# Patient Record
Sex: MALE | Race: WHITE | NOT HISPANIC OR LATINO | Employment: FULL TIME
[De-identification: names, ages, dates, MRNs, and addresses within clinical notes are randomized per-mention and may not be internally consistent; named-entity substitution may affect disease eponyms.]

---

## 2017-01-06 ENCOUNTER — RECORDS - HEALTHEAST (OUTPATIENT)
Dept: ADMINISTRATIVE | Facility: OTHER | Age: 57
End: 2017-01-06

## 2017-02-08 ENCOUNTER — COMMUNICATION - HEALTHEAST (OUTPATIENT)
Dept: FAMILY MEDICINE | Facility: CLINIC | Age: 57
End: 2017-02-08

## 2017-02-08 DIAGNOSIS — I10 ESSENTIAL HYPERTENSION: ICD-10-CM

## 2017-10-22 ENCOUNTER — COMMUNICATION - HEALTHEAST (OUTPATIENT)
Dept: FAMILY MEDICINE | Facility: CLINIC | Age: 57
End: 2017-10-22

## 2017-10-22 DIAGNOSIS — I10 ESSENTIAL HYPERTENSION: ICD-10-CM

## 2017-12-13 ENCOUNTER — COMMUNICATION - HEALTHEAST (OUTPATIENT)
Dept: SCHEDULING | Facility: CLINIC | Age: 57
End: 2017-12-13

## 2017-12-13 DIAGNOSIS — I10 ESSENTIAL HYPERTENSION: ICD-10-CM

## 2018-01-02 ENCOUNTER — OFFICE VISIT - HEALTHEAST (OUTPATIENT)
Dept: FAMILY MEDICINE | Facility: CLINIC | Age: 58
End: 2018-01-02

## 2018-01-02 DIAGNOSIS — E78.00 PURE HYPERCHOLESTEROLEMIA: ICD-10-CM

## 2018-01-02 DIAGNOSIS — N18.30 CHRONIC KIDNEY DISEASE, STAGE III (MODERATE) (H): ICD-10-CM

## 2018-01-02 DIAGNOSIS — K40.90 LEFT INGUINAL HERNIA: ICD-10-CM

## 2018-01-02 DIAGNOSIS — I12.9 NEPHROSCLEROSIS: ICD-10-CM

## 2018-01-02 DIAGNOSIS — Z12.11 SCREEN FOR COLON CANCER: ICD-10-CM

## 2018-01-02 DIAGNOSIS — Z23 FLU VACCINE NEED: ICD-10-CM

## 2018-01-02 DIAGNOSIS — I10 ESSENTIAL HYPERTENSION: ICD-10-CM

## 2018-01-02 DIAGNOSIS — K21.9 ESOPHAGEAL REFLUX: ICD-10-CM

## 2018-01-02 ASSESSMENT — MIFFLIN-ST. JEOR: SCORE: 1802.82

## 2018-01-15 ENCOUNTER — COMMUNICATION - HEALTHEAST (OUTPATIENT)
Dept: SURGERY | Facility: CLINIC | Age: 58
End: 2018-01-15

## 2018-01-18 ENCOUNTER — OFFICE VISIT - HEALTHEAST (OUTPATIENT)
Dept: SURGERY | Facility: CLINIC | Age: 58
End: 2018-01-18

## 2018-01-18 DIAGNOSIS — K40.90 LEFT INGUINAL HERNIA: ICD-10-CM

## 2018-01-18 ASSESSMENT — MIFFLIN-ST. JEOR: SCORE: 1782.41

## 2018-02-07 LAB
ALBUMIN SERPL-MCNC: 3.9 G/DL (ref 3.5–5)
ALBUMIN SERPL-MCNC: 3.9 G/DL (ref 3.5–5)
ALP SERPL-CCNC: 73 U/L (ref 45–120)
ALT SERPL W P-5'-P-CCNC: 65 U/L (ref 0–45)
ANION GAP SERPL CALCULATED.3IONS-SCNC: 11 MMOL/L (ref 5–18)
AST SERPL W P-5'-P-CCNC: 43 U/L (ref 0–40)
BILIRUB DIRECT SERPL-MCNC: 0.2 MG/DL
BILIRUB SERPL-MCNC: 0.9 MG/DL (ref 0–1)
BUN SERPL-MCNC: 26 MG/DL (ref 8–22)
CALCIUM SERPL-MCNC: 9.6 MG/DL (ref 8.5–10.5)
CHLORIDE BLD-SCNC: 104 MMOL/L (ref 98–107)
CHOLEST SERPL-MCNC: 214 MG/DL
CO2 SERPL-SCNC: 24 MMOL/L (ref 22–31)
CREAT SERPL-MCNC: 1.91 MG/DL (ref 0.7–1.3)
ERYTHROCYTE [DISTWIDTH] IN BLOOD BY AUTOMATED COUNT: 13.5 % (ref 11–14.5)
FASTING STATUS PATIENT QL REPORTED: YES
GFR SERPL CREATININE-BSD FRML MDRD: 37 ML/MIN/1.73M2
GLUCOSE BLD-MCNC: 78 MG/DL (ref 70–125)
HCT VFR BLD AUTO: 43.4 % (ref 40–54)
HDLC SERPL-MCNC: 38 MG/DL
HGB BLD-MCNC: 14.6 G/DL (ref 14–18)
LDLC SERPL CALC-MCNC: 149 MG/DL
MCH RBC QN AUTO: 30.3 PG (ref 27–34)
MCHC RBC AUTO-ENTMCNC: 33.6 G/DL (ref 32–36)
MCV RBC AUTO: 90 FL (ref 80–100)
PHOSPHATE SERPL-MCNC: 2.8 MG/DL (ref 2.5–4.5)
PLATELET # BLD AUTO: 172 THOU/UL (ref 140–440)
PMV BLD AUTO: 7.8 FL (ref 7–10)
POTASSIUM BLD-SCNC: 4.3 MMOL/L (ref 3.5–5)
PROT SERPL-MCNC: 7.2 G/DL (ref 6–8)
RBC # BLD AUTO: 4.81 MILL/UL (ref 4.4–6.2)
SODIUM SERPL-SCNC: 139 MMOL/L (ref 136–145)
TRIGL SERPL-MCNC: 135 MG/DL
WBC: 5.5 THOU/UL (ref 4–11)

## 2018-02-08 LAB — 25(OH)D3 SERPL-MCNC: 31.5 NG/ML (ref 30–80)

## 2018-02-12 ENCOUNTER — COMMUNICATION - HEALTHEAST (OUTPATIENT)
Dept: SCHEDULING | Facility: CLINIC | Age: 58
End: 2018-02-12

## 2018-02-12 DIAGNOSIS — I10 ESSENTIAL HYPERTENSION: ICD-10-CM

## 2018-02-21 ENCOUNTER — COMMUNICATION - HEALTHEAST (OUTPATIENT)
Dept: FAMILY MEDICINE | Facility: CLINIC | Age: 58
End: 2018-02-21

## 2018-02-26 ENCOUNTER — OFFICE VISIT - HEALTHEAST (OUTPATIENT)
Dept: FAMILY MEDICINE | Facility: CLINIC | Age: 58
End: 2018-02-26

## 2018-02-26 DIAGNOSIS — E78.00 PURE HYPERCHOLESTEROLEMIA: ICD-10-CM

## 2018-02-26 DIAGNOSIS — N18.30 CHRONIC KIDNEY DISEASE, STAGE III (MODERATE) (H): ICD-10-CM

## 2018-02-26 DIAGNOSIS — K40.90 LEFT INGUINAL HERNIA: ICD-10-CM

## 2018-02-26 DIAGNOSIS — I10 ESSENTIAL HYPERTENSION: ICD-10-CM

## 2018-02-26 DIAGNOSIS — Z01.818 PREOPERATIVE EXAMINATION: ICD-10-CM

## 2018-02-26 LAB
ATRIAL RATE - MUSE: 57 BPM
DIASTOLIC BLOOD PRESSURE - MUSE: NORMAL MMHG
INTERPRETATION ECG - MUSE: NORMAL
P AXIS - MUSE: 57 DEGREES
PR INTERVAL - MUSE: 140 MS
QRS DURATION - MUSE: 92 MS
QT - MUSE: 422 MS
QTC - MUSE: 410 MS
R AXIS - MUSE: -4 DEGREES
SYSTOLIC BLOOD PRESSURE - MUSE: NORMAL MMHG
T AXIS - MUSE: 3 DEGREES
VENTRICULAR RATE- MUSE: 57 BPM

## 2018-02-26 ASSESSMENT — MIFFLIN-ST. JEOR: SCORE: 1781.16

## 2018-02-27 ENCOUNTER — ANESTHESIA - HEALTHEAST (OUTPATIENT)
Dept: SURGERY | Facility: AMBULATORY SURGERY CENTER | Age: 58
End: 2018-02-27

## 2018-02-28 ENCOUNTER — SURGERY - HEALTHEAST (OUTPATIENT)
Dept: SURGERY | Facility: AMBULATORY SURGERY CENTER | Age: 58
End: 2018-02-28

## 2018-02-28 ASSESSMENT — MIFFLIN-ST. JEOR: SCORE: 1745.33

## 2019-02-19 ENCOUNTER — COMMUNICATION - HEALTHEAST (OUTPATIENT)
Dept: SCHEDULING | Facility: CLINIC | Age: 59
End: 2019-02-19

## 2019-02-19 DIAGNOSIS — I10 ESSENTIAL HYPERTENSION: ICD-10-CM

## 2019-03-11 ENCOUNTER — COMMUNICATION - HEALTHEAST (OUTPATIENT)
Dept: FAMILY MEDICINE | Facility: CLINIC | Age: 59
End: 2019-03-11

## 2019-04-12 ENCOUNTER — COMMUNICATION - HEALTHEAST (OUTPATIENT)
Dept: SCHEDULING | Facility: CLINIC | Age: 59
End: 2019-04-12

## 2019-04-12 DIAGNOSIS — I10 ESSENTIAL HYPERTENSION: ICD-10-CM

## 2019-05-21 ENCOUNTER — COMMUNICATION - HEALTHEAST (OUTPATIENT)
Dept: FAMILY MEDICINE | Facility: CLINIC | Age: 59
End: 2019-05-21

## 2019-05-21 DIAGNOSIS — I10 ESSENTIAL HYPERTENSION: ICD-10-CM

## 2019-06-03 ENCOUNTER — OFFICE VISIT - HEALTHEAST (OUTPATIENT)
Dept: FAMILY MEDICINE | Facility: CLINIC | Age: 59
End: 2019-06-03

## 2019-06-03 DIAGNOSIS — E78.00 PURE HYPERCHOLESTEROLEMIA: ICD-10-CM

## 2019-06-03 DIAGNOSIS — N18.30 CHRONIC KIDNEY DISEASE, STAGE III (MODERATE) (H): ICD-10-CM

## 2019-06-03 DIAGNOSIS — Z12.11 SCREEN FOR COLON CANCER: ICD-10-CM

## 2019-06-03 DIAGNOSIS — I10 ESSENTIAL HYPERTENSION: ICD-10-CM

## 2019-06-03 DIAGNOSIS — I12.9 NEPHROSCLEROSIS, STAGE 1 THROUGH STAGE 4 OR UNSPECIFIED CHRONIC KIDNEY DISEASE: ICD-10-CM

## 2019-06-03 LAB
25(OH)D3 SERPL-MCNC: 42.2 NG/ML (ref 30–80)
25(OH)D3 SERPL-MCNC: 42.2 NG/ML (ref 30–80)
ALBUMIN SERPL-MCNC: 3.9 G/DL (ref 3.5–5)
ALBUMIN SERPL-MCNC: 3.9 G/DL (ref 3.5–5)
ALP SERPL-CCNC: 74 U/L (ref 45–120)
ALT SERPL W P-5'-P-CCNC: 40 U/L (ref 0–45)
ANION GAP SERPL CALCULATED.3IONS-SCNC: 10 MMOL/L (ref 5–18)
AST SERPL W P-5'-P-CCNC: 35 U/L (ref 0–40)
BILIRUB DIRECT SERPL-MCNC: 0.3 MG/DL
BILIRUB SERPL-MCNC: 0.7 MG/DL (ref 0–1)
BUN SERPL-MCNC: 29 MG/DL (ref 8–22)
CALCIUM SERPL-MCNC: 9.7 MG/DL (ref 8.5–10.5)
CHLORIDE BLD-SCNC: 107 MMOL/L (ref 98–107)
CHOLEST SERPL-MCNC: 188 MG/DL
CO2 SERPL-SCNC: 22 MMOL/L (ref 22–31)
CREAT SERPL-MCNC: 2.05 MG/DL (ref 0.7–1.3)
ERYTHROCYTE [DISTWIDTH] IN BLOOD BY AUTOMATED COUNT: 13.3 % (ref 11–14.5)
FASTING STATUS PATIENT QL REPORTED: YES
GFR SERPL CREATININE-BSD FRML MDRD: 34 ML/MIN/1.73M2
GLUCOSE BLD-MCNC: 90 MG/DL (ref 70–125)
HBA1C MFR BLD: 5.1 % (ref 3.5–6)
HCT VFR BLD AUTO: 42.3 % (ref 40–54)
HDLC SERPL-MCNC: 38 MG/DL
HGB BLD-MCNC: 14.2 G/DL (ref 14–18)
LDLC SERPL CALC-MCNC: 131 MG/DL
MCH RBC QN AUTO: 30.5 PG (ref 27–34)
MCHC RBC AUTO-ENTMCNC: 33.6 G/DL (ref 32–36)
MCV RBC AUTO: 91 FL (ref 80–100)
PHOSPHATE SERPL-MCNC: 2.6 MG/DL (ref 2.5–4.5)
PLATELET # BLD AUTO: 130 THOU/UL (ref 140–440)
PMV BLD AUTO: 7.9 FL (ref 7–10)
POTASSIUM BLD-SCNC: 4.6 MMOL/L (ref 3.5–5)
PROT SERPL-MCNC: 6.8 G/DL (ref 6–8)
RBC # BLD AUTO: 4.66 MILL/UL (ref 4.4–6.2)
SODIUM SERPL-SCNC: 139 MMOL/L (ref 136–145)
TRIGL SERPL-MCNC: 97 MG/DL
WBC: 5.6 THOU/UL (ref 4–11)

## 2019-06-03 RX ORDER — SIMVASTATIN 10 MG
10 TABLET ORAL AT BEDTIME
Qty: 90 TABLET | Refills: 3 | Status: SHIPPED | OUTPATIENT
Start: 2019-06-03 | End: 2022-11-02

## 2019-06-05 ENCOUNTER — COMMUNICATION - HEALTHEAST (OUTPATIENT)
Dept: FAMILY MEDICINE | Facility: CLINIC | Age: 59
End: 2019-06-05

## 2019-06-21 ENCOUNTER — COMMUNICATION - HEALTHEAST (OUTPATIENT)
Dept: FAMILY MEDICINE | Facility: CLINIC | Age: 59
End: 2019-06-21

## 2019-06-21 DIAGNOSIS — I10 ESSENTIAL HYPERTENSION: ICD-10-CM

## 2019-10-19 ENCOUNTER — RECORDS - HEALTHEAST (OUTPATIENT)
Dept: ADMINISTRATIVE | Facility: OTHER | Age: 59
End: 2019-10-19

## 2019-10-21 ENCOUNTER — COMMUNICATION - HEALTHEAST (OUTPATIENT)
Dept: FAMILY MEDICINE | Facility: CLINIC | Age: 59
End: 2019-10-21

## 2019-10-23 ENCOUNTER — OFFICE VISIT - HEALTHEAST (OUTPATIENT)
Dept: FAMILY MEDICINE | Facility: CLINIC | Age: 59
End: 2019-10-23

## 2019-10-23 DIAGNOSIS — Z23 IMMUNIZATION DUE: ICD-10-CM

## 2019-10-23 DIAGNOSIS — R16.1 SPLENIC MASS: ICD-10-CM

## 2019-10-23 DIAGNOSIS — N28.9 LESION OF BOTH NATIVE KIDNEYS: ICD-10-CM
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
## 2019-10-23 ASSESSMENT — MIFFLIN-ST. JEOR: SCORE: 1820.97

## 2019-10-24 ENCOUNTER — COMMUNICATION - HEALTHEAST (OUTPATIENT)
Dept: FAMILY MEDICINE | Facility: CLINIC | Age: 59
End: 2019-10-24

## 2019-11-05 ENCOUNTER — HOSPITAL ENCOUNTER (OUTPATIENT)
Dept: MRI IMAGING | Facility: HOSPITAL | Age: 59
Discharge: HOME | End: 2019-11-05
Attending: FAMILY MEDICINE

## 2019-11-05 DIAGNOSIS — R16.1 SPLENIC MASS: ICD-10-CM

## 2019-11-05 DIAGNOSIS — N28.9 LESION OF BOTH NATIVE KIDNEYS: ICD-10-CM

## 2019-11-05 LAB
CREAT BLD-MCNC: 2.1 MG/DL (ref 0.7–1.3)
GFR SERPL CREATININE-BSD FRML MDRD: 33 ML/MIN/1.73M2

## 2020-07-08 ENCOUNTER — COMMUNICATION - HEALTHEAST (OUTPATIENT)
Dept: FAMILY MEDICINE | Facility: CLINIC | Age: 60
End: 2020-07-08

## 2020-07-08 DIAGNOSIS — I10 ESSENTIAL HYPERTENSION: ICD-10-CM

## 2020-08-09 ENCOUNTER — COMMUNICATION - HEALTHEAST (OUTPATIENT)
Dept: FAMILY MEDICINE | Facility: CLINIC | Age: 60
End: 2020-08-09

## 2020-08-09 DIAGNOSIS — I10 ESSENTIAL HYPERTENSION: ICD-10-CM

## 2020-08-27 ENCOUNTER — COMMUNICATION - HEALTHEAST (OUTPATIENT)
Dept: FAMILY MEDICINE | Facility: CLINIC | Age: 60
End: 2020-08-27

## 2020-08-27 DIAGNOSIS — I10 ESSENTIAL HYPERTENSION: ICD-10-CM

## 2020-10-20 ENCOUNTER — RECORDS - HEALTHEAST (OUTPATIENT)
Dept: ADMINISTRATIVE | Facility: OTHER | Age: 60
End: 2020-10-20

## 2020-11-24 ENCOUNTER — COMMUNICATION - HEALTHEAST (OUTPATIENT)
Dept: FAMILY MEDICINE | Facility: CLINIC | Age: 60
End: 2020-11-24

## 2020-11-24 DIAGNOSIS — I10 ESSENTIAL HYPERTENSION: ICD-10-CM

## 2021-03-25 ENCOUNTER — COMMUNICATION - HEALTHEAST (OUTPATIENT)
Dept: FAMILY MEDICINE | Facility: CLINIC | Age: 61
End: 2021-03-25

## 2021-03-25 DIAGNOSIS — I10 ESSENTIAL HYPERTENSION: ICD-10-CM

## 2021-03-31 ENCOUNTER — COMMUNICATION - HEALTHEAST (OUTPATIENT)
Dept: FAMILY MEDICINE | Facility: CLINIC | Age: 61
End: 2021-03-31

## 2021-03-31 DIAGNOSIS — I10 ESSENTIAL HYPERTENSION: ICD-10-CM

## 2021-04-01 ENCOUNTER — AMBULATORY - HEALTHEAST (OUTPATIENT)
Dept: NURSING | Facility: CLINIC | Age: 61
End: 2021-04-01

## 2021-04-13 ENCOUNTER — OFFICE VISIT - HEALTHEAST (OUTPATIENT)
Dept: FAMILY MEDICINE | Facility: CLINIC | Age: 61
End: 2021-04-13

## 2021-04-13 DIAGNOSIS — Z12.11 COLON CANCER SCREENING: ICD-10-CM

## 2021-04-13 DIAGNOSIS — E78.00 PURE HYPERCHOLESTEROLEMIA: ICD-10-CM

## 2021-04-13 DIAGNOSIS — N18.32 STAGE 3B CHRONIC KIDNEY DISEASE (H): ICD-10-CM

## 2021-04-13 DIAGNOSIS — I10 ESSENTIAL HYPERTENSION: ICD-10-CM

## 2021-04-13 RX ORDER — LOSARTAN POTASSIUM 100 MG/1
100 TABLET ORAL DAILY
Qty: 90 TABLET | Refills: 3 | Status: SHIPPED | OUTPATIENT
Start: 2021-04-13 | End: 2022-04-21

## 2021-04-22 ENCOUNTER — AMBULATORY - HEALTHEAST (OUTPATIENT)
Dept: NURSING | Facility: CLINIC | Age: 61
End: 2021-04-22

## 2021-05-25 ENCOUNTER — RECORDS - HEALTHEAST (OUTPATIENT)
Dept: ADMINISTRATIVE | Facility: CLINIC | Age: 61
End: 2021-05-25

## 2021-05-27 NOTE — TELEPHONE ENCOUNTER
Please call.  I refilled losartan but he is due for an office visit.  Please have him make an appointment with me when he is able.

## 2021-05-27 NOTE — TELEPHONE ENCOUNTER
Left message for pt that he is due for an OV with Dr. Souza.  Please assist with scheduling upon return call.

## 2021-05-27 NOTE — TELEPHONE ENCOUNTER
RN cannot approve Refill Request    RN can NOT refill this medication PCP messaged that patient is overdue for Office Visit.        Bridgett Sandoval, [facility] Triage/Med Refill 4/15/2019    Requested Prescriptions   Pending Prescriptions Disp Refills     losartan (COZAAR) 100 MG tablet [Pharmacy Med Name: Losartan Potassium Oral Tablet 100 MG] 30 tablet 0     Sig: TAKE 1 TABLET (100 MG) BY MOUTH ONCE DAILY       Angiotensin Receptor Blocker Protocol Failed - 4/12/2019  2:54 PM        Failed - PCP or prescribing provider visit in past 12 months       Last office visit with prescriber/PCP: 1/2/2018 Derrell Souza MD OR same dept: Visit date not found OR same specialty: Visit date not found  Last physical: 2/26/2018 Last MTM visit: Visit date not found   Next visit within 3 mo: Visit date not found  Next physical within 3 mo: Visit date not found  Prescriber OR PCP: Derrell Souza MD  Last diagnosis associated with med order: 1. Essential hypertension  - losartan (COZAAR) 100 MG tablet [Pharmacy Med Name: Losartan Potassium Oral Tablet 100 MG]; TAKE 1 TABLET (100 MG) BY MOUTH ONCE DAILY   Dispense: 30 tablet; Refill: 0    If protocol passes may refill for 12 months if within 3 months of last provider visit (or a total of 15 months).             Failed - Serum potassium within the past 12 months     No results found for: LN-POTASSIUM          Failed - Blood pressure filed in past 12 months     BP Readings from Last 1 Encounters:   02/28/18 138/66             Failed - Serum creatinine within the past 12 months     Creatinine   Date Value Ref Range Status   02/07/2018 1.91 (H) 0.70 - 1.30 mg/dL Final

## 2021-05-29 NOTE — PROGRESS NOTES
Assessment/ Plan     1. Essential hypertension, currently stable    Continue losartan   Remain physically active  Continue to monitor blood pressure    2. Screen for colon cancer    Refer for a colonoscopy  - Ambulatory referral for Colonoscopy    3. Chronic kidney disease, stage III (moderate) (H)  4. Nephrosclerosis, stage 1 through stage 4 or unspecified chronic kidney disease    Check laboratory testing including a renal profile, vitamin D level, and hemogram of platelets  Continue losartan and simvastatin  Discussed the importance of adequate control of blood pressure as well as cholesterol  Recommend follow-up with neurology as he has not seen them for a number of years  Patient understands he will avoid NSAIDs    - Ambulatory referral to Nephrology    - HM2(CBC w/o Differential)  - Vitamin D, Total (25-Hydroxy)  - Renal Function Profile  - Ambulatory referral to Nephrology    5. Essential Hypercholesterolemia    Continue simvastatin  Check laboratory testing as noted  - Hepatic Profile  - Lipid Cascade  - Glycosylated Hemoglobin A1c  - simvastatin (ZOCOR) 10 MG tablet; Take 1 tablet (10 mg total) by mouth at bedtime.  Dispense: 90 tablet; Refill: 3      Subjective:       Daniel Le is a 59 y.o. male who presents for a medication check and follow-up for hypertension and chronic kidney disease.  He has a medical history of hypertension, hyperlipidemia, gastroesophageal reflux disease, and chronic kidney disease.  As noted, he has previously followed up with nephrology given an elevated creatinine and low GFR.  He was assessed to have arteriosclerosis due to hypertension, dyslipidemia, and aging.  He continues to take losartan and simvastatin has been compliant with his medications.  He has not seen nephrology for a number of years. His creatinine was 1.91 last year with a GFR of 37.    In February of 2018 he did have a left inguinal hernia repair.  He tolerated the surgery well.  His last total  cholesterol was 214 with an LDL of 149.    He reports he generally feels well.  Review of systems is negative for headache, dizziness, chest pain, palpitations, or bowel changes.  He is due for a colonoscopy.    The following portions of the patient's history were reviewed and updated as appropriate: allergies, current medications, past family history, past medical history, past social history, past surgical history and problem list. Medications have been reconciled.    Review of Systems   A 12 point comprehensive review of systems was negative except as noted.      Current Outpatient Medications   Medication Sig Dispense Refill     losartan (COZAAR) 100 MG tablet TAKE 1 TABLET (100 MG) BY MOUTH ONCE DAILY 30 tablet 0     simvastatin (ZOCOR) 10 MG tablet Take 1 tablet (10 mg total) by mouth at bedtime. 90 tablet 3     No current facility-administered medications for this visit.        Objective:      /74   Pulse 64   Wt 214 lb 14.4 oz (97.5 kg)   BMI 31.06 kg/m        General appearance: alert, appears stated age and cooperative  Head: Normocephalic, without obvious abnormality, atraumatic  Eyes: conjunctivae/corneas clear. PERRL, EOM's intact.   Nose: Nares normal. Septum midline. Mucosa normal  Throat: lips, mucosa, and tongue normal; teeth and gums normal  Neck: no adenopathy, supple, symmetrical, trachea midline   Back: symmetric, no curvature. ROM normal. No CVA tenderness.  Lungs: clear to auscultation bilaterally  Heart: regular rate and rhythm, S1, S2 normal, no murmur, click, rub or gallop  Extremities: extremities normal, atraumatic, no cyanosis or edema  Skin: Skin color, texture, turgor normal. No rashes or lesions  Lymph nodes: Cervical nodes normal.  Neurologic: Alert and oriented X 3         Recent Results (from the past 168 hour(s))   HM2(CBC w/o Differential)   Result Value Ref Range    WBC 5.6 4.0 - 11.0 thou/uL    RBC 4.66 4.40 - 6.20 mill/uL    Hemoglobin 14.2 14.0 - 18.0 g/dL     Hematocrit 42.3 40.0 - 54.0 %    MCV 91 80 - 100 fL    MCH 30.5 27.0 - 34.0 pg    MCHC 33.6 32.0 - 36.0 g/dL    RDW 13.3 11.0 - 14.5 %    Platelets 130 (L) 140 - 440 thou/uL    MPV 7.9 7.0 - 10.0 fL   Hepatic Profile   Result Value Ref Range    Bilirubin, Total 0.7 0.0 - 1.0 mg/dL    Bilirubin, Direct 0.3 <=0.5 mg/dL    Protein, Total 6.8 6.0 - 8.0 g/dL    Albumin 3.9 3.5 - 5.0 g/dL    Alkaline Phosphatase 74 45 - 120 U/L    AST 35 0 - 40 U/L    ALT 40 0 - 45 U/L   Lipid Cascade   Result Value Ref Range    Cholesterol 188 <=199 mg/dL    Triglycerides 97 <=149 mg/dL    HDL Cholesterol 38 (L) >=40 mg/dL    LDL Calculated 131 (H) <=129 mg/dL    Patient Fasting > 8hrs? Yes    Glycosylated Hemoglobin A1c   Result Value Ref Range    Hemoglobin A1c 5.1 3.5 - 6.0 %   Vitamin D, Total (25-Hydroxy)   Result Value Ref Range    Vitamin D, Total (25-Hydroxy) 42.2 30.0 - 80.0 ng/mL   Renal Function Profile   Result Value Ref Range    Albumin 3.9 3.5 - 5.0 g/dL    Calcium 9.7 8.5 - 10.5 mg/dL    Phosphorus 2.6 2.5 - 4.5 mg/dL    Glucose 90 70 - 125 mg/dL    BUN 29 (H) 8 - 22 mg/dL    Creatinine 2.05 (H) 0.70 - 1.30 mg/dL    Sodium 139 136 - 145 mmol/L    Potassium 4.6 3.5 - 5.0 mmol/L    Chloride 107 98 - 107 mmol/L    CO2 22 22 - 31 mmol/L    Anion Gap, Calculation 10 5 - 18 mmol/L    GFR MDRD Af Amer 41 (L) >60 mL/min/1.73m2    GFR MDRD Non Af Amer 34 (L) >60 mL/min/1.73m2          This note has been dictated using voice recognition software. Any grammatical or context distortions are unintentional and inherent to the software

## 2021-05-29 NOTE — TELEPHONE ENCOUNTER
RN cannot approve Refill Request    RN can NOT refill this medication PCP messaged that patient is overdue for Labs, Office Visit and blood pressure check. . Last office visit: 1/2/2018 Derrell Souza MD Last Physical: 2/26/2018 Last MTM visit: Visit date not found Last visit same specialty: 1/2/2018 Derrell Souza MD.  Next visit within 3 mo: Visit date not found  Next physical within 3 mo: Visit date not found      Patricia Hinojosa, [facility] Triage/Med Refill 5/21/2019    Requested Prescriptions   Pending Prescriptions Disp Refills     losartan (COZAAR) 100 MG tablet 30 tablet 0     Sig: TAKE 1 TABLET (100 MG) BY MOUTH ONCE DAILY       Angiotensin Receptor Blocker Protocol Failed - 5/21/2019  3:05 PM        Failed - PCP or prescribing provider visit in past 12 months       Last office visit with prescriber/PCP: 1/2/2018 Derrell Souza MD OR same dept: Visit date not found OR same specialty: 1/2/2018 Derrell Souza MD  Last physical: 2/26/2018 Last MTM visit: Visit date not found   Next visit within 3 mo: Visit date not found  Next physical within 3 mo: Visit date not found  Prescriber OR PCP: Derrell Souza MD  Last diagnosis associated with med order: 1. Essential hypertension  - losartan (COZAAR) 100 MG tablet; TAKE 1 TABLET (100 MG) BY MOUTH ONCE DAILY  Dispense: 30 tablet; Refill: 0    If protocol passes may refill for 12 months if within 3 months of last provider visit (or a total of 15 months).             Failed - Serum potassium within the past 12 months     No results found for: LN-POTASSIUM          Failed - Blood pressure filed in past 12 months     BP Readings from Last 1 Encounters:   02/28/18 138/66             Failed - Serum creatinine within the past 12 months     Creatinine   Date Value Ref Range Status   02/07/2018 1.91 (H) 0.70 - 1.30 mg/dL Final

## 2021-05-29 NOTE — PATIENT INSTRUCTIONS - HE
Continue losartan for blood pressure  We will check your laboratory testing as discussed  I do recommend follow-up with the kidney specialist  Set up your colonosocpy

## 2021-05-29 NOTE — TELEPHONE ENCOUNTER
Refill Request  Did you contact pharmacy: No.  Patient was informed to call the pharmacy.  Medication name:   Requested Prescriptions     Pending Prescriptions Disp Refills     losartan (COZAAR) 100 MG tablet 30 tablet 0     Sig: TAKE 1 TABLET (100 MG) BY MOUTH ONCE DAILY     Who prescribed the medication: Derrell Souza MD   [facility]  Is patient out of medication: Yes  Patient notified refills processed in 72 hours:  yes  Okay to leave a detailed message: no    Patient is scheduled to see Derrell Souza MD on 6/3/19.

## 2021-05-29 NOTE — TELEPHONE ENCOUNTER
Please call.  I will refill losartan but would like him to make an appointment with me.  We will need to do laboratory testing then.  Thank you

## 2021-05-29 NOTE — TELEPHONE ENCOUNTER
Refill Approved    Rx renewed per Medication Renewal Policy. Medication was last renewed on 5/21/19.    Bridgett Sandoval, [facility] Triage/Med Refill 6/24/2019     Requested Prescriptions   Pending Prescriptions Disp Refills     losartan (COZAAR) 100 MG tablet [Pharmacy Med Name: Losartan Potassium Oral Tablet 100 MG] 30 tablet 0     Sig: TAKE 1 TABLET (100 MG) BY MOUTH ONCE DAILY       Angiotensin Receptor Blocker Protocol Passed - 6/21/2019  4:58 PM        Passed - PCP or prescribing provider visit in past 12 months       Last office visit with prescriber/PCP: 6/3/2019 Derrell Souza MD OR same dept: 6/3/2019 Derrell Souza MD OR same specialty: 6/3/2019 Derrell Souza MD  Last physical: 2/26/2018 Last MTM visit: Visit date not found   Next visit within 3 mo: Visit date not found  Next physical within 3 mo: Visit date not found  Prescriber OR PCP: Derrell Souza MD  Last diagnosis associated with med order: 1. Essential hypertension  - losartan (COZAAR) 100 MG tablet [Pharmacy Med Name: Losartan Potassium Oral Tablet 100 MG]; TAKE 1 TABLET (100 MG) BY MOUTH ONCE DAILY  Dispense: 30 tablet; Refill: 0    If protocol passes may refill for 12 months if within 3 months of last provider visit (or a total of 15 months).             Passed - Serum potassium within the past 12 months     Lab Results   Component Value Date    Potassium 4.6 06/03/2019             Passed - Blood pressure filed in past 12 months     BP Readings from Last 1 Encounters:   06/03/19 124/74             Passed - Serum creatinine within the past 12 months     Creatinine   Date Value Ref Range Status   06/03/2019 2.05 (H) 0.70 - 1.30 mg/dL Final

## 2021-05-30 ENCOUNTER — RECORDS - HEALTHEAST (OUTPATIENT)
Dept: ADMINISTRATIVE | Facility: CLINIC | Age: 61
End: 2021-05-30

## 2021-05-31 VITALS — HEIGHT: 70 IN | BODY MASS INDEX: 31.14 KG/M2 | WEIGHT: 217.5 LBS

## 2021-05-31 VITALS — WEIGHT: 213 LBS | HEIGHT: 70 IN | BODY MASS INDEX: 30.49 KG/M2

## 2021-06-01 VITALS — WEIGHT: 213.6 LBS | HEIGHT: 70 IN | BODY MASS INDEX: 30.58 KG/M2

## 2021-06-01 VITALS — WEIGHT: 205.7 LBS | HEIGHT: 70 IN | BODY MASS INDEX: 29.45 KG/M2

## 2021-06-02 NOTE — TELEPHONE ENCOUNTER
----- Message from Sierra Mckeon DO sent at 10/24/2019  7:59 AM CDT -----  Please call Daniel,    I discussed findings with radiology, and they recommend proceeding with an abdominal MRI to further evaluate both the spleen and the kidneys. I have ordered this test and someone should be reaching out to help coordinate this schedule.    Some patients with claustrophobia need sedation for MRI images. Please let me know if this is something you think you may need, and I can order the medication for this to take beforehand. If you need sedation, you will need a .    I hope your full day back to work went well.    Sierra Mckeon DO  ----- Message -----  From: Sierra Mckeon DO  Sent: 10/24/2019  To: Sierra Mckeon DO    Call radiology.     CT scan at [facility] emergency room on October 19 revealed splenic lesion 2.5 cm x 2 cm.  Recommended comparison with prior, however not available.  Additionally, kidney lesions noted, recommended pre-and postcontrast CT scan.  CKD 3 chronic, most recent creatinine 2.1 with GFR 33, GFR stable for the last 6 years in the mid 30s.  He is following with nephrology.    Okay to proceed with pre-and post contrast CT for kidneys with CKD 3 GFR 33?  Recommendations for further evaluation of splenic lesion?

## 2021-06-02 NOTE — TELEPHONE ENCOUNTER
Left message for pt to call back.  Please relay Dr. Mckeon's message below:    Please call Daniel,     I discussed findings with radiology, and they recommend proceeding with an abdominal MRI to further evaluate both the spleen and the kidneys. I have ordered this test and someone should be reaching out to help coordinate this schedule.     Some patients with claustrophobia need sedation for MRI images. Please let me know if this is something you think you may need, and I can order the medication for this to take beforehand. If you need sedation, you will need a .     I hope your full day back to work went well.     Sierra Mckeon, DO      Please give pt the number to schedule MRI. ([phone])

## 2021-06-02 NOTE — TELEPHONE ENCOUNTER
FYI - Status Update  Who is Calling: Spouse  Update: Patient's  Spouse is calling for the patient as the number left for a call back has changed. Please call the patient back at [phone] to confirm if he can be booked with PCP this week, previous thread.  Okay to leave a detailed message?:  Yes

## 2021-06-02 NOTE — TELEPHONE ENCOUNTER
I called and reviewed.  He did have a head injury and will require follow-up.  Please assist him in getting in the clinic in the next couple of days.  I am back in clinic on Friday.  If there is a problem I can try to work him in then if needed though I don't believe he wants to wait that long.

## 2021-06-02 NOTE — TELEPHONE ENCOUNTER
Spoke to pt, relayed message below. Pt already received this message.  No medication needed for MRI.  Pt is scheduled on 11/5/19.

## 2021-06-02 NOTE — TELEPHONE ENCOUNTER
New Appointment Needed  What is the reason for the visit:    Inpatient/ED Follow Up Appt Request  At what hospital or facility were you seen?: [facility]  What is the reason you were seen?: Fell of ladder, Hit head.   What date were you admitted?: 10/19/19  What date were you discharged?: 10/19/19  What was the recommended timeframe for your follow up appointment?: Within 3 days  Provider Preference: PCP only  How soon do you need to be seen?: today or tomorrow.  Waitlist offered?: No  Okay to leave a detailed message:  Yes with patient's wife Antonella.

## 2021-06-02 NOTE — PROGRESS NOTES
[facility] Note    Name: Daniel Le  : 1960   MRN: [id number]    Daniel Le is a 59 y.o. male patient of [facility], Derrell Higgins MD presenting to discuss the following:     CC:   Chief Complaint   Patient presents with     Follow-up     Urgency Room-head injury       HPI:  Daniel was seen in the urgency room on  after falling 6 to 8 feet off a ladder and landing on the deck.  He may have hit his head on the right iron chair.  He did not have any bleeding.  He had CT of the cervical spine without acute fracture or posttraumatic subluxation.  CT head brain without contrast showed moderate right parietal-occipital superficial soft tissue contusion with subgaleal hematoma.  Small overlying foreign body within subcutaneous tissues.  No intracranial hemorrhage, mass, or acute infarct.  CT chest abdomen pelvis without contrast revealed a 2.5 x 2 cm indeterminate splenic mass.  Small simple and mildly complex renal cysts, recommending follow-up dedicated pre-and postcontrast CTs if no priors available. For management of symptoms, he was given Flexeril, ibuprofen, and Percocet.  He had a normal CBC.  BMP revealed hyperglycemia and elevated creatinine at 2.1.    Head is feeling okay. Noted some orthostatic dizziness symptoms, not sure if mild concussion or side effects of medications, symptoms are improving now. Worked from home on Monday and Tuesday, more fatigued. Yesterday was okay. Is going to work today.     Will be seeing nephrology in November.     HEALTH MAINTENANCE:   - Flu shot: will do today     ROS:   See HPI above.     PMH:   Patient Active Problem List   Diagnosis     Esophageal Reflux     Essential Hypercholesterolemia     Essential Hypertension     Chronic kidney disease, stage III (moderate) (H)     Nephrosclerosis     Chronic Renal Insufficiency     Left inguinal hernia       Past Medical History:   Diagnosis Date     Chronic kidney disease     Stage 3      "Hypertension      PSH:   Past Surgical History:   Procedure Laterality Date     LAPAROSCOPIC INGUINAL HERNIA REPAIR Left 2/28/2018    Procedure: REPAIR, HERNIA, INGUINAL, LAPAROSCOPIC;  Surgeon: Ryan Dempsey MD;  Location: [facility];  Service:      Left inguinal hernia surgery Left      WISDOM TOOTH EXTRACTION       MEDICATIONS:   Current Outpatient Medications on File Prior to Visit   Medication Sig Dispense Refill     ibuprofen (ADVIL,MOTRIN) 600 MG tablet TAKE 1 TABLET BY MOUTH EVERY 8 HOURS IF NEEDED FOR PAIN. MAXIMUM OF 3200 MG IN 24 HOURS.  0     losartan (COZAAR) 100 MG tablet TAKE 1 TABLET (100 MG) BY MOUTH ONCE DAILY 90 tablet 3     simvastatin (ZOCOR) 10 MG tablet Take 1 tablet (10 mg total) by mouth at bedtime. 90 tablet 3     cyclobenzaprine (FLEXERIL) 10 MG tablet   0     No current facility-administered medications on file prior to visit.        ALLERGIES:  No Known Allergies    PHYSICAL EXAM:   /78   Pulse 80   Resp 16   Ht 5' 9.75\" (1.772 m)   Wt (!) 222 lb 6 oz (100.9 kg)   BMI 32.14 kg/m  GENERAL: Daniel is a pleasant, obese male, no acute distress.  HEENT: Sclera white, EOMI, PERRL, no nasal discharge.  Normal neck range of motion without pain.  HEART: Regular rate and rhythm, no murmurs.  LUNGS: Clear to auscultation bilaterally, unlabored.  MSK: Right lumbar paraspinal musculature is slightly tender to palpation, no ecchymosis or erythema present.  NEURO: No gross deficits    ASSESSMENT & PLAN:   Daniel Le is a 59 y.o. male presenting today for follow-up emergency room status post fall.    1. Fall, subsequent encounter  Reviewed note from emergency room.  He met criteria for head and neck imaging, no intracranial findings, traumatic subluxations, or neck fractures.  He has symptoms consistent with a mild concussion, however symptoms are rapidly improving.  He is planning to return to work today.    If he does not tolerate return to work or has worsening of his " "symptoms (dizziness, lightheadedness, brain fog, fatigue, headache, or light sensitivity), we can provide note for work restrictions.    2. Splenic mass  Incidental finding of 2.5 cm x 2 cm splenic mass.  No prior images for comparison.  Will discuss with radiology for next steps in evaluation.    3. Lesion of both native kidneys  CT report states \"probable small simple and mildly complex renal cyst too small to characterize definitively\".  No previous images available for comparison.  Radiologist recommended dedicated pre-and postcontrast CT of kidneys.  Of note, he has CKD 3, most recent creatinine 2.1 with GFR of 33.  He is following with nephrology.  GFR stable in the mid 30s for the last 6 years.  Will discuss options with radiology for further evaluation.    4. Immunization due  - Influenza, Recombinant, Inj, Quadrivalent, PF, 18+YRS     RTC: We will be in touch with imaging follow-up plans.  He will let us know if he needs any restrictions for work.  Recommended follow-up in 1 month for annual physical exam.    Sierra Mckeon DO    ADDENDUM: 10/24/19 7:51 AM  Spoke with radiology regarding incidental findings on chest abdomen pelvis CT performed at [facility] emergency room on October 19.  No prior imaging available for comparison.  Splenic lesions are often benign, likely a hemangioma, however recommends proceeding with MRI for further evaluation.  If still indeterminant, could consider biopsy.  Regarding kidneys, due to CKD 3, recommended MRI to further evaluate as well.  Will order MRI abdomen with renal mass protocol, also to assess spleen.  Sierra Mckeon,        "

## 2021-06-02 NOTE — TELEPHONE ENCOUNTER
Left message for pt to call back.  Please relay Dr. Mckeon's message below:     Please call Daniel,     I discussed findings with radiology, and they recommend proceeding with an abdominal MRI to further evaluate both the spleen and the kidneys. I have ordered this test and someone should be reaching out to help coordinate this schedule.     Some patients with claustrophobia need sedation for MRI images. Please let me know if this is something you think you may need, and I can order the medication for this to take beforehand. If you need sedation, you will need a .     I hope your full day back to work went well.     Sierra Mckeon, DO        Please give pt the number to schedule MRI. ([phone])

## 2021-06-03 VITALS — BODY MASS INDEX: 31.06 KG/M2 | WEIGHT: 214.9 LBS

## 2021-06-03 VITALS
BODY MASS INDEX: 31.84 KG/M2 | RESPIRATION RATE: 16 BRPM | WEIGHT: 222.38 LBS | HEIGHT: 70 IN | DIASTOLIC BLOOD PRESSURE: 78 MMHG | HEART RATE: 80 BPM | SYSTOLIC BLOOD PRESSURE: 122 MMHG

## 2021-06-03 NOTE — PROGRESS NOTES
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 Patient updated by MyChart.  Sierra Mckeon, DO

## 2021-06-05 VITALS
BODY MASS INDEX: 30.32 KG/M2 | DIASTOLIC BLOOD PRESSURE: 92 MMHG | HEART RATE: 64 BPM | SYSTOLIC BLOOD PRESSURE: 145 MMHG | WEIGHT: 209.8 LBS | TEMPERATURE: 97 F | RESPIRATION RATE: 16 BRPM

## 2021-06-09 NOTE — TELEPHONE ENCOUNTER
RN cannot approve Refill Request    RN can NOT refill this medication Protocol failed and NO refill given. Last office visit: 6/3/2019 Derrell Souza MD Last Physical: 2/26/2018 Last MTM visit: Visit date not found Last visit same specialty: 10/23/2019 Sierra Mckeon DO.  Next visit within 3 mo: Visit date not found  Next physical within 3 mo: Visit date not found      Bridgett Sandoval, [facility] Triage/Med Refill 7/10/2020    Requested Prescriptions   Pending Prescriptions Disp Refills     losartan (COZAAR) 100 MG tablet [Pharmacy Med Name: Losartan Potassium Oral Tablet 100 MG] 90 tablet 3     Sig: TAKE 1 TABLET (100 MG) BY MOUTH ONCE DAILY       Angiotensin Receptor Blocker Protocol Failed - 7/8/2020  7:01 AM        Failed - Serum potassium within the past 12 months     No results found for: LN-POTASSIUM          Failed - Serum creatinine within the past 12 months     Creatinine   Date Value Ref Range Status   06/03/2019 2.05 (H) 0.70 - 1.30 mg/dL Final             Passed - PCP or prescribing provider visit in past 12 months       Last office visit with prescriber/PCP: 6/3/2019 Derrell Souza MD OR same dept: 10/23/2019 Sierra Mckeon DO OR same specialty: 10/23/2019 Sierra Mckeon DO  Last physical: 2/26/2018 Last MTM visit: Visit date not found   Next visit within 3 mo: Visit date not found  Next physical within 3 mo: Visit date not found  Prescriber OR PCP: Derrell Souza MD  Last diagnosis associated with med order: 1. Essential hypertension  - losartan (COZAAR) 100 MG tablet [Pharmacy Med Name: Losartan Potassium Oral Tablet 100 MG]; TAKE 1 TABLET (100 MG) BY MOUTH ONCE DAILY  Dispense: 90 tablet; Refill: 0    If protocol passes may refill for 12 months if within 3 months of last provider visit (or a total of 15 months).             Passed - Blood pressure filed in past 12 months     BP Readings from Last 1 Encounters:   10/23/19 122/78

## 2021-06-09 NOTE — TELEPHONE ENCOUNTER
Please call.  I refilled his losartan but I recommend a virtual visit with me.  We will decide on laboratory testing at the time of his appointment given that he has had abnormal kidney function tests.

## 2021-06-10 NOTE — TELEPHONE ENCOUNTER
FYI - Status Update  Who is Calling: Patient  Update: Patient stated he was informed by the pharmacy they did not get this prescription. Caller phoned in prescription below. Patient notified and has no further questions at this time.     Disp  Refills  Start  End     losartan (COZAAR) 100 MG tablet  90 tablet  0  8/21/2020      Sig: TAKE 1 TABLET (100 MG) BY MOUTH ONCE DAILY     Sent to pharmacy as: losartan 100 mg tablet (COZAAR)     E-Prescribing Status: Receipt confirmed by pharmacy (8/21/2020  3:15 PM CDT)         Okay to leave a detailed message?:  No return call needed

## 2021-06-10 NOTE — TELEPHONE ENCOUNTER
RN cannot approve Refill Request    RN can NOT refill this medication Protocol failed and NO refill given. Last office visit: 6/3/2019 Derrell Souza MD Last Physical: 2/26/2018 Last MTM visit: Visit date not found Last visit same specialty: 10/23/2019 Sierra Mckeon DO.  Next visit within 3 mo: Visit date not found  Next physical within 3 mo: Visit date not found      Bridgett Sandoval, [facility] Triage/Med Refill 8/10/2020    Requested Prescriptions   Pending Prescriptions Disp Refills     losartan (COZAAR) 100 MG tablet [Pharmacy Med Name: Losartan Potassium Oral Tablet 100 MG] 90 tablet 0     Sig: TAKE 1 TABLET (100 MG) BY MOUTH ONCE DAILY       Angiotensin Receptor Blocker Protocol Failed - 8/9/2020  4:41 PM        Failed - Serum potassium within the past 12 months     No results found for: LN-POTASSIUM          Failed - Serum creatinine within the past 12 months     Creatinine   Date Value Ref Range Status   06/03/2019 2.05 (H) 0.70 - 1.30 mg/dL Final             Passed - PCP or prescribing provider visit in past 12 months       Last office visit with prescriber/PCP: 6/3/2019 Derrell Souza MD OR same dept: 10/23/2019 Sierra Mckeon DO OR same specialty: 10/23/2019 Sierra Mckeon DO  Last physical: 2/26/2018 Last MTM visit: Visit date not found   Next visit within 3 mo: Visit date not found  Next physical within 3 mo: Visit date not found  Prescriber OR PCP: Derrell Souza MD  Last diagnosis associated with med order: 1. Essential hypertension  - losartan (COZAAR) 100 MG tablet [Pharmacy Med Name: Losartan Potassium Oral Tablet 100 MG]; TAKE 1 TABLET (100 MG) BY MOUTH ONCE DAILY  Dispense: 90 tablet; Refill: 0    If protocol passes may refill for 12 months if within 3 months of last provider visit (or a total of 15 months).             Passed - Blood pressure filed in past 12 months     BP Readings from Last 1 Encounters:   10/23/19 122/78

## 2021-06-10 NOTE — TELEPHONE ENCOUNTER
RN cannot approve Refill Request    RN can NOT refill this medication PCP messaged that patient is overdue for Labs and Protocol failed and NO refill given. Last office visit: 6/3/2019 Derrell Souza MD Last Physical: 2/26/2018 Last MTM visit: Visit date not found Last visit same specialty: 10/23/2019 Sierra Mckeon DO.  Next visit within 3 mo: Visit date not found  Next physical within 3 mo: Visit date not found      Sandhya Morgan, [facility] Triage/Med Refill 8/21/2020    Requested Prescriptions   Pending Prescriptions Disp Refills     losartan (COZAAR) 100 MG tablet [Pharmacy Med Name: Losartan Potassium Oral Tablet 100 MG] 90 tablet 0     Sig: TAKE 1 TABLET (100 MG) BY MOUTH ONCE DAILY       Angiotensin Receptor Blocker Protocol Failed - 8/21/2020  9:11 AM        Failed - Serum potassium within the past 12 months     No results found for: LN-POTASSIUM          Failed - Serum creatinine within the past 12 months     Creatinine   Date Value Ref Range Status   06/03/2019 2.05 (H) 0.70 - 1.30 mg/dL Final             Passed - PCP or prescribing provider visit in past 12 months       Last office visit with prescriber/PCP: 6/3/2019 Derrell Souza MD OR same dept: 10/23/2019 Sierra Mckeon DO OR same specialty: 10/23/2019 Sierra Mckeon DO  Last physical: 2/26/2018 Last MTM visit: Visit date not found   Next visit within 3 mo: Visit date not found  Next physical within 3 mo: Visit date not found  Prescriber OR PCP: Derrell Souza MD  Last diagnosis associated with med order: 1. Essential hypertension  - losartan (COZAAR) 100 MG tablet [Pharmacy Med Name: Losartan Potassium Oral Tablet 100 MG]; TAKE 1 TABLET (100 MG) BY MOUTH ONCE DAILY  Dispense: 90 tablet; Refill: 0    If protocol passes may refill for 12 months if within 3 months of last provider visit (or a total of 15 months).             Passed - Blood pressure filed in past 12 months     BP Readings from Last 1 Encounters:    10/23/19 122/78

## 2021-06-11 NOTE — TELEPHONE ENCOUNTER
RN cannot approve Refill Request    RN can NOT refill this medication Protocol failed and NO refill given. Last office visit: 6/3/2019 Derrell Souza MD Last Physical: 2/26/2018 Last MTM visit: Visit date not found Last visit same specialty: 10/23/2019 Sierra Mckeon DO.  Next visit within 3 mo: Visit date not found  Next physical within 3 mo: Visit date not found      Bridgett Sandoval, [facility] Triage/Med Refill 8/31/2020    Requested Prescriptions   Pending Prescriptions Disp Refills     losartan (COZAAR) 100 MG tablet [Pharmacy Med Name: Losartan Potassium Oral Tablet 100 MG] 90 tablet 0     Sig: TAKE 1 TABLET (100 MG) BY MOUTH ONCE DAILY       Angiotensin Receptor Blocker Protocol Failed - 8/27/2020 10:24 AM        Failed - Serum potassium within the past 12 months     No results found for: LN-POTASSIUM          Failed - Serum creatinine within the past 12 months     Creatinine   Date Value Ref Range Status   06/03/2019 2.05 (H) 0.70 - 1.30 mg/dL Final             Passed - PCP or prescribing provider visit in past 12 months       Last office visit with prescriber/PCP: 6/3/2019 Derrell Souza MD OR same dept: 10/23/2019 Sierra Mckeon DO OR same specialty: 10/23/2019 Sierra Mckeon DO  Last physical: 2/26/2018 Last MTM visit: Visit date not found   Next visit within 3 mo: Visit date not found  Next physical within 3 mo: Visit date not found  Prescriber OR PCP: Derrell Souza MD  Last diagnosis associated with med order: 1. Essential hypertension  - losartan (COZAAR) 100 MG tablet [Pharmacy Med Name: Losartan Potassium Oral Tablet 100 MG]; TAKE 1 TABLET (100 MG) BY MOUTH ONCE DAILY  Dispense: 90 tablet; Refill: 0    If protocol passes may refill for 12 months if within 3 months of last provider visit (or a total of 15 months).             Passed - Blood pressure filed in past 12 months     BP Readings from Last 1 Encounters:   10/23/19 122/78

## 2021-06-13 NOTE — TELEPHONE ENCOUNTER
RN cannot approve Refill Request    RN can NOT refill this medication PCP messaged that patient is overdue for Labs and Office Visit and Protocol failed and NO refill given. Last office visit: 6/3/2019 Derrell Souza MD Last Physical: 2/26/2018 Last MTM visit: Visit date not found Last visit same specialty: 10/23/2019 Sierra Mckeon DO.  Next visit within 3 mo: Visit date not found  Next physical within 3 mo: Visit date not found      Sandhya Morgan, [facility] Triage/Med Refill 11/24/2020    Requested Prescriptions   Pending Prescriptions Disp Refills     losartan (COZAAR) 100 MG tablet [Pharmacy Med Name: Losartan Potassium Oral Tablet 100 MG] 90 tablet 0     Sig: TAKE 1 TABLET BY MOUTH ONCE DAILY       Angiotensin Receptor Blocker Protocol Failed - 11/24/2020  2:00 AM        Failed - PCP or prescribing provider visit in past 12 months       Last office visit with prescriber/PCP: 6/3/2019 Derrell Souza MD OR same dept: Visit date not found OR same specialty: 10/23/2019 Sierra Mckeon DO  Last physical: 2/26/2018 Last MTM visit: Visit date not found   Next visit within 3 mo: Visit date not found  Next physical within 3 mo: Visit date not found  Prescriber OR PCP: Derrell Souza MD  Last diagnosis associated with med order: 1. Essential hypertension  - losartan (COZAAR) 100 MG tablet [Pharmacy Med Name: Losartan Potassium Oral Tablet 100 MG]; TAKE 1 TABLET BY MOUTH ONCE DAILY   Dispense: 90 tablet; Refill: 0    If protocol passes may refill for 12 months if within 3 months of last provider visit (or a total of 15 months).             Failed - Serum potassium within the past 12 months     No results found for: LN-POTASSIUM          Failed - Blood pressure filed in past 12 months     BP Readings from Last 1 Encounters:   10/23/19 122/78             Failed - Serum creatinine within the past 12 months     Creatinine   Date Value Ref Range Status   06/03/2019 2.05 (H) 0.70 - 1.30 mg/dL  Final

## 2021-06-15 NOTE — PROGRESS NOTES
HPI:  Daniel Le is a 57 y.o. male who was referred to me by Derrell Souza MD for an inguinal hernia. He  presents today with complaints of a left inguinal hernia that he thinks is been present since October 2017, but has started causing him increasing discomfort over the past several weeks.  He notes this in particular when he is exercising or engaging in heavy or physical activity.  Denies any obstructive symptoms such as constipation or obstipation.  Denies any symptoms on the right side.  He has never had previous intra-abdominal surgery, and has never had a general anesthetic.      Allergies:Review of patient's allergies indicates no known allergies.    Medical history notable for hypertension, stage III kidney disease secondary to nephrosclerosis.    History reviewed. No pertinent surgical history.    CURRENT MEDS:  Current Outpatient Prescriptions   Medication Sig Dispense Refill     losartan (COZAAR) 100 MG tablet TAKE 1 TABLET (100 MG) BY MOUTH ONCE DAILY 30 tablet 1     simvastatin (ZOCOR) 10 MG tablet Take 1 tablet (10 mg total) by mouth bedtime. 90 tablet 3     No current facility-administered medications for this visit.        Family History   Problem Relation Age of Onset     Cancer Mother      breast and bladder cancer     Breast cancer Mother      Dementia Father      Diabetes Father      Colitis Maternal Grandfather         reports that he has never smoked. He has never used smokeless tobacco.    Review of Systems -   The 10 point review of systems  is within normal limits except for as mentioned above in the HPI.  General ROS: No complaints or constitutional symptoms  Skin: No complaints or symptoms   Hematologic/Lymphatic: No symptoms or complaints  Psychiatric: No symptoms or complaints  Endocrine: No excessive fatigue, no hypermetabolic symptoms reported  Respiratory ROS: no cough, shortness of breath, or wheezing  Cardiovascular ROS: no chest pain or dyspnea on  "exertion  Gastrointestinal ROS: As per HPI  Musculoskeletal ROS: no recent injuries reported  Neurological ROS: no focal neurologic defects reported.        /82 (Patient Site: Right Arm, Patient Position: Sitting, Cuff Size: Adult Regular)  Pulse 85  Ht 5' 10\" (1.778 m)  Wt 213 lb (96.6 kg)  SpO2 97%  BMI 30.56 kg/m2  Body mass index is 30.56 kg/(m^2).    EXAM:  General : Alert, cooperative, appears stated age   Skin: Skin color, texture, turgor normal, no rashes or lesions   Lymphatic: No obvious adenopathy, no swelling   Eyes: No scleral icterus, pupils equal  HENT: no traumatic injury to the head or face, no gross abnormalities  Lungs: Normal respiratory effort, breath sounds equal bilaterally  Heart: Regular rate and rhythm  Abdomen: Visible asymmetry of the abdomen, with a bulging left groin.  Consistent with a direct inguinal hernia on the left.  Slight increased laxity in the right groin, might be an early right hernia.  Musculoskeletal: No obvious swelling,  Neurologic: Grossly intact        Assessment/Plan:   1. Left inguinal hernia        Daniel Le is a 57 y.o. male with a symptomatic left inguinal hernia.  I have discussed the pathophysiology of inguinal hernias at length as well as the  surgical and non-operative management strategies.      In particular, the risks and benefits of laparoscopic vs. open inguinal hernia surgery were explained in detail which include, but are not limited to, bleeding, infection of the mesh, recurrence of the hernia, chronic pain, poor cosmesis, the need for reoperative intervention, the possibility of conversion from a laparoscopic approach to an open approach, subcutaneous emphysema, injury to vital structures,  blood clots, heart attack, stroke and death.  Additionally, the risks of observation were also discussed in detail which include, but are not limited to, chronic pain, enlargement of the hernia, incarceration, strangulation and death.      He " understands everything that was discussed and has consented to proceed with surgery.   We will plan on scheduling a laparoscopic left inguinal hernia repair at his desired date.       Ryan Dempsey MD  [phone]  [facility] Department of Surgery

## 2021-06-16 PROBLEM — R16.1 SPLENIC MASS: Status: ACTIVE | Noted: 2019-10-23

## 2021-06-16 PROBLEM — N28.9 LESION OF BOTH NATIVE KIDNEYS: Status: ACTIVE | Noted: 2019-10-23

## 2021-06-16 PROBLEM — K40.90 LEFT INGUINAL HERNIA: Status: ACTIVE | Noted: 2018-02-07

## 2021-06-16 NOTE — PATIENT INSTRUCTIONS - HE
You may make a follow-up appointment for laboratory testing  Follow-up with the kidney specialist  Great job with your weight loss

## 2021-06-16 NOTE — TELEPHONE ENCOUNTER
Telephone Encounter by Brittany Juares CMA at 3/11/2019 10:13 AM     Author: Brittany Juares CMA Service: -- Author Type: Certified Medical Assistant    Filed: 3/11/2019 10:16 AM Encounter Date: 3/11/2019 Status: Signed    : Brittany Juares CMA (Certified Medical Assistant)       Derrell Souza MD P Moriarity, Joseph Care Team Pool          Previous Messages         Prior Authorization: Detail Entry Needed     Contact the payer to obtain prior authorization.      Payer:  [employer] Generic Payer   View History   Medication Being Authorized      losartan (COZAAR) 100 MG tablet     TAKE 1 TABLET (100 MG) BY MOUTH ONCE DAILY      Dispense: 30 tablet Refills: 0     Start: 2/28/2019      Class: Normal Diagnoses: Essential hypertension     This order has been released to its destination.    To be filled at: [id number] [place] [id number]       Prior Authorization History for LOSARTAN 100 MG ORAL TAB     1 year ago Closed

## 2021-06-16 NOTE — ANESTHESIA POSTPROCEDURE EVALUATION
Patient: Daniel Le  REPAIR, HERNIA, INGUINAL, LAPAROSCOPIC  Anesthesia type: general    Patient location: Phase II Recovery  Last vitals:   Vitals:    02/28/18 1246   BP: 139/75   Pulse: 70   Resp: 16   Temp: 37.1  C (98.8  F)   SpO2: 94%     Post vital signs: stable  Level of consciousness: awake and responds to simple questions  Post-anesthesia pain: pain controlled  Post-anesthesia nausea and vomiting: no  Pulmonary: unassisted, return to baseline  Cardiovascular: stable and blood pressure at baseline  Hydration: adequate  Anesthetic events: no    QCDR Measures:  ASA# 11 - Yareli-op Cardiac Arrest: ASA11B - Patient did NOT experience unanticipated cardiac arrest  ASA# 12 - Yareli-op Mortality Rate: ASA12B - Patient did NOT die  ASA# 13 - PACU Re-Intubation Rate: ASA13B - Patient did NOT require a new airway mgmt  ASA# 10 - Composite Anes Safety: ASA10A - No serious adverse event    Additional Notes:

## 2021-06-16 NOTE — TELEPHONE ENCOUNTER
RN cannot approve Refill Request    RN can NOT refill this medication PCP messaged that patient is overdue for Labs and Office Visit. Last office visit: 6/3/2019 Derrell Souza MD Last Physical: 2/26/2018 Last MTM visit: Visit date not found Last visit same specialty: 10/23/2019 Sierra Mckeon DO.  Next visit within 3 mo: Visit date not found  Next physical within 3 mo: Visit date not found      Ana Angel, [facility] Triage/Med Refill 3/31/2021    Requested Prescriptions   Pending Prescriptions Disp Refills     losartan (COZAAR) 100 MG tablet 90 tablet 0     Sig: TAKE 1 TABLET BY MOUTH ONCE DAILY       Angiotensin Receptor Blocker Protocol Failed - 3/31/2021  1:42 PM        Failed - PCP or prescribing provider visit in past 12 months       Last office visit with prescriber/PCP: 6/3/2019 Derrell Souza MD OR same dept: Visit date not found OR same specialty: 10/23/2019 Sierra Mckeon DO  Last physical: 2/26/2018 Last MTM visit: Visit date not found   Next visit within 3 mo: Visit date not found  Next physical within 3 mo: Visit date not found  Prescriber OR PCP: Derrell Souza MD  Last diagnosis associated with med order: 1. Essential hypertension  - losartan (COZAAR) 100 MG tablet; TAKE 1 TABLET BY MOUTH ONCE DAILY  Dispense: 90 tablet; Refill: 0    If protocol passes may refill for 12 months if within 3 months of last provider visit (or a total of 15 months).             Failed - Serum potassium within the past 12 months     No results found for: LN-POTASSIUM          Failed - Blood pressure filed in past 12 months     BP Readings from Last 1 Encounters:   10/23/19 122/78             Failed - Serum creatinine within the past 12 months     Creatinine   Date Value Ref Range Status   06/03/2019 2.05 (H) 0.70 - 1.30 mg/dL Final

## 2021-06-16 NOTE — TELEPHONE ENCOUNTER
Telephone Encounter by Onelia Crawford at 3/12/2019 10:41 AM     Author: Onelia Crawford Service: -- Author Type: --    Filed: 3/12/2019 10:42 AM Encounter Date: 3/11/2019 Status: Signed    : Onelia Crawford       Per insurance, PA is not required for medication. Called pharmacy and they were able to get a paid claim.

## 2021-06-16 NOTE — ANESTHESIA CARE TRANSFER NOTE
Last vitals:   Vitals:    02/28/18 1132   BP: 163/74   Pulse: 74   Resp: 16   Temp: 36.7  C (98.1  F)   SpO2: 100%     Patient's level of consciousness is drowsy  Spontaneous respirations: yes  Maintains airway independently: yes  Dentition unchanged: yes  Oropharynx: oropharynx clear of all foreign objects    QCDR Measures:  ASA# 20 - Surgical Safety Checklist: WHO surgical safety checklist completed prior to induction  PQRS# 430 - Adult PONV Prevention: 4558F - Pt received => 2 anti-emetic agents (different classes) preop & intraop  ASA# 8 - Peds PONV Prevention: NA - Not pediatric patient, not GA or 2 or more risk factors NOT present  PQRS# 424 - Yareli-op Temp Management: 4559F - At least one body temp DOCUMENTED => 35.5C or 95.9F within required timeframe  PQRS# 426 - PACU Transfer Protocol: - Transfer of care checklist used  ASA# 14 - Acute Post-op Pain: ASA14B - Patient did NOT experience pain >= 7 out of 10

## 2021-06-16 NOTE — PROGRESS NOTES
Assessment/ Plan     1. Essential hypertension    Inadequate control  He has been out of his medication  Recommend that he continue losartan 100 mg daily  Follow-up for future laboratory testing as noted including a renal profile  Recommend working on dietary lifestyle changes  Commended patient for his 10 pound weight loss    - losartan (COZAAR) 100 MG tablet; Take 1 tablet (100 mg total) by mouth daily.  Dispense: 90 tablet; Refill: 3    2. Stage 3b chronic kidney disease    Resume losartan  Reviewed the importance of adequate blood pressure control  Recommend avoiding NSAIDs and other nephrotoxic substances  Follow-up for laboratory testing as noted    - Renal Function Profile; Future  - Hepatic Profile; Future  - HM2(CBC w/o Differential); Future  - Lipid Cascade; Future  - Protein/Creatinine Ratio, Urine; Future  - Parathyroid Hormone Intact; Future  - Vitamin D, Total (25-Hydroxy); Future  - Ambulatory referral to Nephrology    3. Essential Hypercholesterolemia    Continue simvastatin  We will set up a fasting laboratory test    4. Colon cancer screening    Reviewed options.  He is willing to consider Cologuard  - Cologuard    Spent a total of 30 minutes including chart review and review of records as well as time with patient  This included reviewing medications and documentation      Subjective:       Daniel Le is a 60 y.o. male who presents for a medication check as he has not been seen by this physician since 2019.  He has a history of hypertension as well as stage IIIb chronic kidney disease and hyperlipidemia.  He has followed up with nephrology in the past and is due for a follow-up visit.  He was assessed to have arteriosclerosis due to hypertension, dyslipidemia, and aging.  He typically has been compliant with his medications and is due for follow-up.  His most recent creatinine was 1.99 with a GFR of 35.  He states that he has lost 10 pounds recently.  He has been getting more exercise  including riding a bike.  He has been doing yoga as well.    In October 2020 he had an evaluation by cardiology for lightheadedness and chest pain.  He had emergency department visit with negative troponin enzymes and an ECG did not reveal acute changes.    He has not had recent problems with chest pain, shortness of breath, lightheadedness, or other concerns.    Remainder of review of systems is otherwise negative.      The following portions of the patient's history were reviewed and updated as appropriate: allergies, current medications, past family history, past medical history, past social history, past surgical history and problem list. Medications have been reconciled    Review of Systems   A 12 point comprehensive review of systems was negative except as noted.      Current Outpatient Medications   Medication Sig Dispense Refill     losartan (COZAAR) 100 MG tablet Take 1 tablet (100 mg total) by mouth daily. 90 tablet 3     simvastatin (ZOCOR) 10 MG tablet Take 1 tablet (10 mg total) by mouth at bedtime. 90 tablet 3     No current facility-administered medications for this visit.        Objective:      BP (!) 145/92   Pulse 64   Temp 97  F (36.1  C) (Oral)   Resp 16   Wt 209 lb 12.8 oz (95.2 kg)   BMI 30.32 kg/m      General appearance: alert, appears stated age   Head: normocephalic, without obvious abnormality, atraumatic  Eyes: conjunctivae/corneas clear. PERRL, EOM's intact.   Neck: no adenopathy, supple, symmetrical, trachea midline   Lungs: clear to auscultation bilaterally  Heart: regular rate and rhythm, S1, S2 normal, no murmur, click, rub or gallop  Extremities: extremities normal, atraumatic, no cyanosis or edema  Skin: skin color, texture, turgor normal  Lymph nodes: Cervical nodes normal.  Neurologic: Alert and oriented X 3           No results found for this or any previous visit (from the past 168 hour(s)).       This note has been dictated using voice recognition software. Any  grammatical or context distortions are unintentional and inherent to the software    Derrell Souza MD

## 2021-06-16 NOTE — TELEPHONE ENCOUNTER
Please call.  There is a refill request for losartan but I have not seen him in almost 2 years.  He needs to make an appointment.  Laboratory testing will be due as well.  Please let me know his plans before the refill will be sent.

## 2021-06-16 NOTE — PROGRESS NOTES
Preoperative Exam    Scheduled Procedure: Hernia Repair  Surgery Date:  02/28/2018  Surgery Location: [facility], fax [phone]    Surgeon:  Dr. Fernando    Assessment/Plan:     1. Preoperative examination  2. Left inguinal hernia    ECG reveals sinus bradycardia with a ventricular rate of 57.  There is nonspecific ST abnormality  No acute changes are evident  Approved for surgery  Reviewed laboratory testing  Hemoglobin is 14.6 white count was 5500  Baseline creatinine is 1.91 with a GFR 37.  Potassium is 4.3  Recommend holding NSAIDs prior to surgery  Follow-up as recommended by surgery    3. Essential hypertension    Continue losartan    4. Chronic Kidney Disease, Stage 3    Recommend appropriate blood pressure control  Continue losartan  Continue simvastatin  Check a lipid cascade and hepatic profile in 3 months and consider further adjustments as warranted    5. Essential Hypercholesterolemia    Reviewed recent cholesterol testing  Continue simvastatin 10 mg a day and take each dose  Continue to work on diet and exercise  Recheck lipid cascade and hepatic profile in 3 months        Surgical Procedure Risk: Low (reported cardiac risk generally < 1%)  Have you had prior anesthesia?: Yes  Have you or any family members had a previous anesthesia reaction:  No  Do you or any family members have a history of a clotting or bleeding disorder?: No  Cardiac Risk Assessment: no increased risk for major cardiac complications    Patient approved for surgery with general or local anesthesia.    Please Note:    Functional Status: Independent  Patient plans to recover at home with family.     Subjective:      Daniel Le is a 57 y.o. male who presents for a preoperative consultation.  He recently seen in the clinic for left groin pain and was diagnosed with a left inguinal hernia.  As result, he is a candidate for surgery.  His medical history is notable for hypertension, hyperlipidemia, gastroesophageal  reflux disease, and chronic kidney disease.  He previously followed up with nephrology given his elevated creatinine and low GFR.  He was assessed to have arteriosclerosis due to hypertension, dyslipidemia, and aging.  He continuesto take losartan and simvastatin though does miss occasional doses of simvastatin.    He recently had laboratory testing and his creatinine was 1.91 which is stable compared to previous laboratory testing.  His GFR was 37.  Total cholesterol was 214 with an LDL of 149.  His hemoglobin is 14.6 a white count was 5500.  He generally feels well and denies recent respiratory infection.  Review of systems is negative for headache, dizziness, chest pain, palpitations, or other specific concerns.  He is able to perform at least 4 METS of physical activity without difficulty.     All other systems reviewed and are negative, other than those listed in the HPI.    Pertinent History  Do you have difficulty breathing or chest pain after walking up a flight of stairs: No  History of obstructive sleep apnea: No  Steroid use in the last 6 months: No  Frequent Aspirin/NSAID use: No  Prior Blood Transfusion: No  Prior Blood Transfusion Reaction: No  If for some reason prior to, during or after the procedure, if it is medically indicated, would you be willing to have a blood transfusion?:  There is no transfusion refusal.    Current Outpatient Prescriptions   Medication Sig Dispense Refill     losartan (COZAAR) 100 MG tablet Take 1 tablet (100 mg total) by mouth daily. 90 tablet 3     simvastatin (ZOCOR) 10 MG tablet Take 1 tablet (10 mg total) by mouth bedtime. 90 tablet 3     No current facility-administered medications for this visit.         No Known Allergies    Patient Active Problem List   Diagnosis     Esophageal Reflux     Essential Hypercholesterolemia     Essential Hypertension     Chronic Kidney Disease, Stage 3     Nephrosclerosis     Chronic Renal Insufficiency     Left inguinal hernia       No  "past medical history on file.    No past surgical history on file.    Social History     Social History     Marital status:      Spouse name: N/A     Number of children: N/A     Years of education: N/A     Occupational History     Not on file.     Social History Main Topics     Smoking status: Never Smoker     Smokeless tobacco: Never Used     Alcohol use Not on file     Drug use: Not on file     Sexual activity: Not on file     Other Topics Concern     Not on file     Social History Narrative       Patient Care Team:  Derrell Souza MD as PCP - General          Objective:     Vitals:    02/26/18 1531   BP: 120/70   Pulse: 67   Weight: 213 lb 9.6 oz (96.9 kg)   Height: 5' 9.75\" (1.772 m)         Physical Exam:  General appearance: alert, appears stated age and cooperative  Head: Normocephalic, without obvious abnormality, atraumatic  Eyes: conjunctivae/corneas clear. PERRL, EOM's intact.   Ears: normal TM's and external ear canals both ears  Nose: Nares normal. Septum midline. Mucosa normal. No drainage or sinus tenderness.  Throat: lips, mucosa, and tongue normal; teeth and gums normal  Neck: no adenopathy, supple, symmetrical, trachea midline and thyroid not enlarged  Lungs: clear to auscultation bilaterally  Heart: regular rate and rhythm, S1, S2 normal, no murmur, click, rub or gallop  Abdomen: soft, non-tender; bowel sounds normal  Extremities: extremities normal, atraumatic, no cyanosis or edema  Skin: Skin color, texture, turgor normal. No rashes or lesions  Lymph nodes: Cervical nodes normal.  Neurologic: Alert and oriented X 3. Normal coordination and gait        There are no Patient Instructions on file for this visit.    EKG: Sinus bradycardia with a ventricular rate of 57.  Nonspecific ST abnormality noted  No acute changes are evident  EKG will be reviewed by cardiology    Labs:  No results found for this or any previous visit (from the past 240 hour(s)).    Immunization History "   Administered Date(s) Administered     DT (pediatric) 12/09/2002     Influenza, seasonal,quad inj 36+ mos 01/02/2018     Influenza, seasonal,quad inj 6-35 mos 10/28/2011     Td,adult,historic,unspecified 12/09/2002     Tdap 01/11/2013           Electronically signed by Derrell Souza MD 02/26/18 3:32 PM

## 2021-06-16 NOTE — TELEPHONE ENCOUNTER
RN cannot approve Refill Request    RN can NOT refill this medication PCP messaged that patient is overdue for Labs and Office Visit. Last office visit: 6/3/2019 Derrell Souza MD Last Physical: 2/26/2018 Last MTM visit: Visit date not found Last visit same specialty: 10/23/2019 Sierra Mckeon DO.  Next visit within 3 mo: Visit date not found  Next physical within 3 mo: Visit date not found      Ana Angel, [facility] Triage/Med Refill 3/25/2021    Requested Prescriptions   Pending Prescriptions Disp Refills     losartan (COZAAR) 100 MG tablet [Pharmacy Med Name: Losartan Potassium Oral Tablet 100 MG] 90 tablet 0     Sig: TAKE 1 TABLET BY MOUTH ONCE DAILY       Angiotensin Receptor Blocker Protocol Failed - 3/25/2021  2:01 AM        Failed - PCP or prescribing provider visit in past 12 months       Last office visit with prescriber/PCP: 6/3/2019 Derrell Souza MD OR same dept: Visit date not found OR same specialty: 10/23/2019 Sierra Mckeon DO  Last physical: 2/26/2018 Last MTM visit: Visit date not found   Next visit within 3 mo: Visit date not found  Next physical within 3 mo: Visit date not found  Prescriber OR PCP: Derrell Souza MD  Last diagnosis associated with med order: 1. Essential hypertension  - losartan (COZAAR) 100 MG tablet [Pharmacy Med Name: Losartan Potassium Oral Tablet 100 MG]; TAKE 1 TABLET BY MOUTH ONCE DAILY  Dispense: 90 tablet; Refill: 0    If protocol passes may refill for 12 months if within 3 months of last provider visit (or a total of 15 months).             Failed - Serum potassium within the past 12 months     No results found for: LN-POTASSIUM          Failed - Blood pressure filed in past 12 months     BP Readings from Last 1 Encounters:   10/23/19 122/78             Failed - Serum creatinine within the past 12 months     Creatinine   Date Value Ref Range Status   06/03/2019 2.05 (H) 0.70 - 1.30 mg/dL Final

## 2021-06-19 NOTE — LETTER
Letter by Derrell Souza MD at      Author: Derrell Souza MD Service: -- Author Type: --    Filed:  Encounter Date: 6/5/2019 Status: (Other)         Daniel FABIAN Karonpatel  [address]   [facility] [address]             June 5, 2019         Dear Mr. Le,    Below are the results from your recent visit:    Resulted Orders   HM2(CBC w/o Differential)   Result Value Ref Range    WBC 5.6 4.0 - 11.0 thou/uL    RBC 4.66 4.40 - 6.20 mill/uL    Hemoglobin 14.2 14.0 - 18.0 g/dL    Hematocrit 42.3 40.0 - 54.0 %    MCV 91 80 - 100 fL    MCH 30.5 27.0 - 34.0 pg    MCHC 33.6 32.0 - 36.0 g/dL    RDW 13.3 11.0 - 14.5 %    Platelets 130 (L) 140 - 440 thou/uL    MPV 7.9 7.0 - 10.0 fL   Hepatic Profile   Result Value Ref Range    Bilirubin, Total 0.7 0.0 - 1.0 mg/dL    Bilirubin, Direct 0.3 <=0.5 mg/dL    Protein, Total 6.8 6.0 - 8.0 g/dL    Albumin 3.9 3.5 - 5.0 g/dL    Alkaline Phosphatase 74 45 - 120 U/L    AST 35 0 - 40 U/L    ALT 40 0 - 45 U/L   Lipid Cascade   Result Value Ref Range    Cholesterol 188 <=199 mg/dL    Triglycerides 97 <=149 mg/dL    HDL Cholesterol 38 (L) >=40 mg/dL    LDL Calculated 131 (H) <=129 mg/dL    Patient Fasting > 8hrs? Yes    Glycosylated Hemoglobin A1c   Result Value Ref Range    Hemoglobin A1c 5.1 3.5 - 6.0 %   Vitamin D, Total (25-Hydroxy)   Result Value Ref Range    Vitamin D, Total (25-Hydroxy) 42.2 30.0 - 80.0 ng/mL    Narrative    Deficiency <10.0 ng/mL  Insufficiency 10.0-29.9 ng/mL  Sufficiency 30.0-80.0 ng/mL  Toxicity (possible) >100.0 ng/mL   Renal Function Profile   Result Value Ref Range    Albumin 3.9 3.5 - 5.0 g/dL    Calcium 9.7 8.5 - 10.5 mg/dL    Phosphorus 2.6 2.5 - 4.5 mg/dL    Glucose 90 70 - 125 mg/dL    BUN 29 (H) 8 - 22 mg/dL    Creatinine 2.05 (H) 0.70 - 1.30 mg/dL    Sodium 139 136 - 145 mmol/L    Potassium 4.6 3.5 - 5.0 mmol/L    Chloride 107 98 - 107 mmol/L    CO2 22 22 - 31 mmol/L    Anion Gap, Calculation 10 5 - 18 mmol/L    GFR MDRD Af Amer  41 (L) >60 mL/min/1.73m2    GFR MDRD Non Af Amer 34 (L) >60 mL/min/1.73m2    Narrative    Fasting Glucose reference range is 70-99 mg/dL per  American Diabetes Association (ADA) guidelines.       Charly,    Here is a copy of your test results which show that your kidney tests remain abnormal and may have worsened.  I do recommend that you follow-up with the kidney specialist as advised.    The cholesterol numbers are a bit elevated and we can consider increasing simvastatin to 20 mg daily.  Continue to work on your diet and exercise.  Please let me know your thoughts regarding this.    Your blood counts are generally stable though the platelet count is borderline low.  Your blood sugar test is normal.     Please call with questions or contact us using [employer].    Sincerely,        Electronically signed by Derrell Souza MD

## 2021-06-24 NOTE — TELEPHONE ENCOUNTER
RN cannot approve Refill Request    RN can NOT refill this medication PCP messaged that patient is overdue for Labs. Last office visit: 1/2/2018 Derrell Souza MD Last Physical: 2/26/2018 Last MTM visit: Visit date not found Last visit same specialty: Visit date not found.  Next visit within 3 mo: Visit date not found  Next physical within 3 mo: Visit date not found      Patricia ERMIAS Hinojosa, [facility] Triage/Med Refill 2/20/2019    Requested Prescriptions   Pending Prescriptions Disp Refills     losartan (COZAAR) 100 MG tablet [Pharmacy Med Name: Losartan Potassium Oral Tablet 100 MG] 30 tablet 2     Sig: TAKE 1 TABLET (100 MG) BY MOUTH ONCE DAILY    Angiotensin Receptor Blocker Protocol Failed - 2/19/2019  7:01 AM       Failed - Serum potassium within the past 12 months    No results found for: LN-POTASSIUM         Failed - Serum creatinine within the past 12 months    Creatinine   Date Value Ref Range Status   02/07/2018 1.91 (H) 0.70 - 1.30 mg/dL Final            Passed - PCP or prescribing provider visit in past 12 months      Last office visit with prescriber/PCP: 1/2/2018 Derrell Souza MD OR same dept: Visit date not found OR same specialty: Visit date not found  Last physical: 2/26/2018 Last MTM visit: Visit date not found   Next visit within 3 mo: Visit date not found  Next physical within 3 mo: Visit date not found  Prescriber OR PCP: Derrell Souza MD  Last diagnosis associated with med order: 1. Essential hypertension  - losartan (COZAAR) 100 MG tablet [Pharmacy Med Name: Losartan Potassium Oral Tablet 100 MG]; TAKE 1 TABLET (100 MG) BY MOUTH ONCE DAILY   Dispense: 30 tablet; Refill: 2    If protocol passes may refill for 12 months if within 3 months of last provider visit (or a total of 15 months).            Passed - Blood pressure filed in past 12 months    BP Readings from Last 1 Encounters:   02/28/18 138/66

## 2021-06-24 NOTE — TELEPHONE ENCOUNTER
[facility] PA team  [phone]  Pool: HE PA MED ([id number])    PA has been initiated.       PA form completed and faxed insurance via Cover My Meds     Key:  [id number]     Medication:  LOSARTAN 100MG    Insurance:  [employer]        Response will be received via fax and may take up to 5-10 business days depending on plan

## 2021-06-27 ENCOUNTER — HEALTH MAINTENANCE LETTER (OUTPATIENT)
Age: 61
End: 2021-06-27

## 2021-07-03 NOTE — ADDENDUM NOTE
Addendum Note by Brian Mckeon DO at 10/23/2019  8:00 AM     Author: Brian Mckeon DO Service: -- Author Type: Physician    Filed: 10/24/2019  7:58 AM Encounter Date: 10/23/2019 Status: Signed    : Brian Mckeon DO (Physician)    Addended by: BRIAN MCKEON on: 10/24/2019 07:58 AM        Modules accepted: Orders

## 2021-07-03 NOTE — ANESTHESIA PREPROCEDURE EVALUATION
Anesthesia Preprocedure Evaluation by Power Haddad MD at 2/28/2018  9:27 AM     Author: Power Haddad MD Service: -- Author Type: Physician    Filed: 2/28/2018  9:42 AM Date of Service: 2/28/2018  9:27 AM Status: Addendum    : Power Haddad MD (Physician)    Related Notes: Original Note by Power Haddad MD (Physician) filed at 2/28/2018  9:27 AM       Anesthesia Evaluation      Patient summary reviewed   No history of anesthetic complications     Airway   Mallampati: III  Neck ROM: full   Pulmonary - negative ROS and normal exam    breath sounds clear to auscultation                         Cardiovascular - normal exam  Exercise tolerance: > or = 4 METS  (+) hypertension well controlled, ,     ECG reviewed (SR with LVH)        Neuro/Psych - negative ROS     Endo/Other - negative ROS      GI/Hepatic/Renal    (+) GERD well controlled and intermittent,   chronic renal disease (ckd 3) CRI,           Dental                             Anesthesia Plan  Planned anesthetic: general endotracheal  Versed/fent/prop/olayinka  Decadron/zofran  NO NSAIDS  ASA 2   Induction: intravenous   Anesthetic plan and risks discussed with: patient and spouse  Anesthesia plan special considerations: antiemetics,   Post-op plan: routine recovery        Results for orders placed or performed in visit on 02/26/18   Electrocardiogram Perform and Read   Result Value Ref Range    SYSTOLIC BLOOD PRESSURE  mmHg    DIASTOLIC BLOOD PRESSURE  mmHg    VENTRICULAR RATE 57 BPM    ATRIAL RATE 57 BPM    P-R INTERVAL 140 ms    QRS DURATION 92 ms    Q-T INTERVAL 422 ms    QTC CALCULATION (BEZET) 410 ms    P Axis 57 degrees    R AXIS -4 degrees    T AXIS 3 degrees    MUSE DIAGNOSIS       Sinus bradycardia  Minimal voltage criteria for LVH, may be normal variant  Nonspecific ST abnormality  Abnormal ECG  When compared with ECG of 15-DEC-2006 08:47,  No significant change was found  Confirmed by DUSTY THOMPSON MD LOC:[facility] ([id number]) on 2/26/2018 5:01:23 PM

## 2021-07-26 NOTE — PROGRESS NOTES
Assessment/ Plan     1. Essential hypertension, inadequate control    Reviewed his elevated blood pressure  Discussed dietary and lifestyle modifications.  Recommend increasing physical activity and recommend working on weight loss  He currently is taking losartan 100 mg day.  Discussed the addition of the diuretic the patient prefers to trial lifestyle modification first  Follow-up to recheck blood pressure in 2-4 weeks    Check a renal function profile  - Hepatic Profile  - Lipid Cascade    2. Essential Hypercholesterolemia    Check a lipid cascade and hepatic profile  Continue simvastatin and adjust as needed.  He is tolerating this medication well    3. Chronic Kidney Disease, Stage 3    Reviewed his history  We will check laboratory testing  Recommend a 24-hour urine collection as well  - HM2(CBC w/o Differential)  - Vitamin D, Total (25-Hydroxy)  - Renal Function Profile  If exhibiting worsening kidney function recommend follow-up with nephrology    4. Screen for colon cancer    Refer for a colonoscopy  - Ambulatory referral for Colonoscopy    5. Nephrosclerosis    See plan as noted above    6. Left inguinal hernia    Refer to general surgery  - Ambulatory referral to General Surgery    7. Esophageal reflux    Recommend dietary and lifestyle modification  He may take ranitidine or possibly omeprazole as needed  Recommend follow-up if having persistent symptoms    8. Flu vaccine need    Influenza vaccine was given  - Influenza, Seasonal,Quad Inj, 36+ MOS      Subjective:       Daniel Le is a 57 y.o. male who presents to the clinic as he is concerned about discomfort in the left groin area.  He states that over the past several weeks he has had a discomfort in that area.  He will develop a bulge at times as well with activity.  He cannot think of any specific injury.  He denies urinary symptoms.  He has not had a change in his bowel habits.    He has not been seen in the clinic since 2015. He has a known  "history of hypertension, hyperlipidemia, gastroesophageal reflux disease, and chronic kidney disease. He has followed up with Dr. Ta, a kidney specialist the past given a history of elevated creatinine and low GFR. He was assessed to have arteriosclerosis due to hypertension, dyslipidemia, and aging.   He has not followed up with nephrology recently.  He has been compliant with his medications including losartan and simvastatin.  A review of the chart shows that laboratory tests were ordered in 2015 and not done as he did not follow-up.  He has not had laboratory testing since 2014.    He has not checked his blood pressure recently and his blood pressure is elevated today.  Review of systems is notable for occasional heartburn.  He believes this is related to eating certain foods and realizes he needs to change his diet.  He denies chest pain with exertion or palpitations.  He denies bowel changes.     He has never had a colonoscopy but is willing to consider doing that now.  He would like a flu shot.     The following portions of the patient's history were reviewed and updated as appropriate: allergies, current medications, past family history, past medical history, past social history, past surgical history and problem list.    Review of Systems   A 12 point comprehensive review of systems was negative except as noted.      Current Outpatient Prescriptions   Medication Sig Dispense Refill     losartan (COZAAR) 100 MG tablet TAKE 1 TABLET (100 MG) BY MOUTH ONCE DAILY 30 tablet 1     simvastatin (ZOCOR) 10 MG tablet Take 1 tablet (10 mg total) by mouth bedtime. 90 tablet 3     No current facility-administered medications for this visit.        Objective:      /82  Pulse 72  Temp 98.3  F (36.8  C) (Oral)   Resp 16  Ht 5' 10\" (1.778 m)  Wt 217 lb 8 oz (98.7 kg)  BMI 31.21 kg/m2      General appearance: alert, appears stated age and cooperative  Head: Normocephalic, without obvious abnormality, " atraumatic  Eyes: conjunctivae/corneas clear. PERRL, EOM's intact.   Nose: Nares normal. Septum midline. Mucosa normal. No drainage or sinus tenderness.  Throat: lips, mucosa, and tongue normal; teeth and gums normal  Neck: no adenopathy, supple, symmetrical, trachea midline and thyroid not enlarged  Lungs: clear to auscultation bilaterally  Heart: regular rate and rhythm, S1, S2 normal, no murmur, click, rub or gallop  Abdomen: Soft, nontender  Genitourinary: Penis is circumcised.  There is a palpable left inguinal hernia  Extremities: extremities normal, atraumatic, no cyanosis or edema  Skin: Skin color, texture, turgor normal. No rashes or lesions  Lymph nodes: Cervical nodes normal.  Neurologic: Alert and oriented X 3. Normal coordination and gait         No results found for this or any previous visit (from the past 168 hour(s)).       This note has been dictated using voice recognition software. Any grammatical or context distortions are unintentional and inherent to the software   10

## 2021-08-25 NOTE — TELEPHONE ENCOUNTER
Patient is scheduled for a medication check     Libtayo Counseling- I discussed with the patient the risks of Libtayo including but not limited to nausea, vomiting, diarrhea, and bone or muscle pain.  The patient verbalized understanding of the proper use and possible adverse effects of Libtayo.  All of the patient's questions and concerns were addressed.

## 2021-09-16 ENCOUNTER — TRANSFERRED RECORDS (OUTPATIENT)
Dept: FAMILY MEDICINE | Facility: CLINIC | Age: 61
End: 2021-09-16

## 2021-09-16 LAB — COLOGUARD-ABSTRACT: NEGATIVE

## 2021-10-17 ENCOUNTER — HEALTH MAINTENANCE LETTER (OUTPATIENT)
Age: 61
End: 2021-10-17

## 2022-04-19 DIAGNOSIS — I10 ESSENTIAL HYPERTENSION: ICD-10-CM

## 2022-04-21 NOTE — TELEPHONE ENCOUNTER
"Routing refill request to provider for review/approval because:  Labs not current:  Multiple  Patient needs to be seen because it has been more than 1 year since last office visit.  BP not current    Last Written Prescription Date:  4/13/21  Last Fill Quantity: 90,  # refills: 3   Last office visit provider:  4/13/21     Requested Prescriptions   Pending Prescriptions Disp Refills     losartan (COZAAR) 100 MG tablet [Pharmacy Med Name: LOSARTAN POTASSIUM 100 MG TAB] 90 tablet 3     Sig: TAKE 1 TABLET BY MOUTH EVERY DAY       Angiotensin-II Receptors Failed - 4/21/2022  8:11 AM        Failed - Last blood pressure under 140/90 in past 12 months     BP Readings from Last 3 Encounters:   04/13/21 (!) 145/92   10/23/19 122/78                 Failed - Recent (12 mo) or future (30 days) visit within the authorizing provider's specialty     Patient has had an office visit with the authorizing provider or a provider within the authorizing providers department within the previous 12 mos or has a future within next 30 days. See \"Patient Info\" tab in inbasket, or \"Choose Columns\" in Meds & Orders section of the refill encounter.              Failed - Normal serum creatinine on file in past 12 months     Recent Labs   Lab Test 11/05/19  0800   CR 2.1*       Ok to refill medication if creatinine is low          Failed - Normal serum potassium on file in past 12 months     Recent Labs   Lab Test 06/03/19  0836   POTASSIUM 4.6                    Passed - Medication is active on med list        Passed - Patient is age 18 or older             Dev German RN 04/21/22 8:12 AM  "

## 2022-04-27 RX ORDER — LOSARTAN POTASSIUM 100 MG/1
TABLET ORAL
Qty: 90 TABLET | Refills: 0 | Status: SHIPPED | OUTPATIENT
Start: 2022-04-27 | End: 2022-08-04

## 2022-07-23 ENCOUNTER — HEALTH MAINTENANCE LETTER (OUTPATIENT)
Age: 62
End: 2022-07-23

## 2022-10-01 ENCOUNTER — HEALTH MAINTENANCE LETTER (OUTPATIENT)
Age: 62
End: 2022-10-01

## 2022-11-02 ENCOUNTER — TELEPHONE (OUTPATIENT)
Dept: NURSING | Facility: CLINIC | Age: 62
End: 2022-11-02

## 2022-11-02 DIAGNOSIS — Z12.5 SCREENING FOR PROSTATE CANCER: ICD-10-CM

## 2022-11-02 DIAGNOSIS — E78.00 PURE HYPERCHOLESTEROLEMIA: ICD-10-CM

## 2022-11-02 DIAGNOSIS — N18.32 STAGE 3B CHRONIC KIDNEY DISEASE (H): Primary | ICD-10-CM

## 2022-11-02 DIAGNOSIS — I10 ESSENTIAL HYPERTENSION: ICD-10-CM

## 2022-11-02 RX ORDER — LOSARTAN POTASSIUM 100 MG/1
100 TABLET ORAL DAILY
Qty: 90 TABLET | Refills: 0 | Status: SHIPPED | OUTPATIENT
Start: 2022-11-02 | End: 2023-05-11

## 2022-11-02 RX ORDER — SIMVASTATIN 10 MG
10 TABLET ORAL AT BEDTIME
Qty: 90 TABLET | Refills: 3 | Status: SHIPPED | OUTPATIENT
Start: 2022-11-02 | End: 2022-12-21

## 2022-11-02 NOTE — TELEPHONE ENCOUNTER
Patient requesting PCP place orders to have fasting labs drawn prior to his appointment. Requests this be placed for the labs to be done before his appointment scheduled for 12/21. Pharmacy verified for short term refill to be sent in.    Hattie Delarosa RN 11/02/22 1:18 PM    Health Triage Nurse Advisor

## 2022-12-14 ENCOUNTER — LAB (OUTPATIENT)
Dept: LAB | Facility: CLINIC | Age: 62
End: 2022-12-14
Payer: COMMERCIAL

## 2022-12-14 DIAGNOSIS — Z12.5 SCREENING FOR PROSTATE CANCER: ICD-10-CM

## 2022-12-14 DIAGNOSIS — N18.32 STAGE 3B CHRONIC KIDNEY DISEASE (H): ICD-10-CM

## 2022-12-14 DIAGNOSIS — E78.00 PURE HYPERCHOLESTEROLEMIA: ICD-10-CM

## 2022-12-14 LAB
ALBUMIN SERPL BCG-MCNC: 4.2 G/DL (ref 3.5–5.2)
ALP SERPL-CCNC: 78 U/L (ref 40–129)
ALT SERPL W P-5'-P-CCNC: 48 U/L (ref 10–50)
ANION GAP SERPL CALCULATED.3IONS-SCNC: 12 MMOL/L (ref 7–15)
AST SERPL W P-5'-P-CCNC: 42 U/L (ref 10–50)
BILIRUB SERPL-MCNC: 0.4 MG/DL
BUN SERPL-MCNC: 23.9 MG/DL (ref 8–23)
CALCIUM SERPL-MCNC: 9.4 MG/DL (ref 8.8–10.2)
CHLORIDE SERPL-SCNC: 106 MMOL/L (ref 98–107)
CHOLEST SERPL-MCNC: 201 MG/DL
CREAT SERPL-MCNC: 1.94 MG/DL (ref 0.67–1.17)
CREAT UR-MCNC: 154 MG/DL
DEPRECATED HCO3 PLAS-SCNC: 23 MMOL/L (ref 22–29)
ERYTHROCYTE [DISTWIDTH] IN BLOOD BY AUTOMATED COUNT: 13.4 % (ref 10–15)
GFR SERPL CREATININE-BSD FRML MDRD: 39 ML/MIN/1.73M2
GLUCOSE SERPL-MCNC: 93 MG/DL (ref 70–99)
HCT VFR BLD AUTO: 44.8 % (ref 40–53)
HDLC SERPL-MCNC: 40 MG/DL
HGB BLD-MCNC: 15.3 G/DL (ref 13.3–17.7)
LDLC SERPL CALC-MCNC: 139 MG/DL
MCH RBC QN AUTO: 30.1 PG (ref 26.5–33)
MCHC RBC AUTO-ENTMCNC: 34.2 G/DL (ref 31.5–36.5)
MCV RBC AUTO: 88 FL (ref 78–100)
MICROALBUMIN UR-MCNC: 385 MG/L
MICROALBUMIN/CREAT UR: 250 MG/G CR (ref 0–17)
NONHDLC SERPL-MCNC: 161 MG/DL
PLATELET # BLD AUTO: 146 10E3/UL (ref 150–450)
POTASSIUM SERPL-SCNC: 4.7 MMOL/L (ref 3.4–5.3)
PROT SERPL-MCNC: 6.9 G/DL (ref 6.4–8.3)
PSA SERPL-MCNC: 0.97 NG/ML (ref 0–4.5)
RBC # BLD AUTO: 5.08 10E6/UL (ref 4.4–5.9)
SODIUM SERPL-SCNC: 141 MMOL/L (ref 136–145)
TRIGL SERPL-MCNC: 111 MG/DL
WBC # BLD AUTO: 5.4 10E3/UL (ref 4–11)

## 2022-12-14 PROCEDURE — 80061 LIPID PANEL: CPT

## 2022-12-14 PROCEDURE — 82306 VITAMIN D 25 HYDROXY: CPT

## 2022-12-14 PROCEDURE — 85027 COMPLETE CBC AUTOMATED: CPT

## 2022-12-14 PROCEDURE — 36415 COLL VENOUS BLD VENIPUNCTURE: CPT

## 2022-12-14 PROCEDURE — 82043 UR ALBUMIN QUANTITATIVE: CPT

## 2022-12-14 PROCEDURE — G0103 PSA SCREENING: HCPCS

## 2022-12-14 PROCEDURE — 80053 COMPREHEN METABOLIC PANEL: CPT

## 2022-12-15 LAB — DEPRECATED CALCIDIOL+CALCIFEROL SERPL-MC: 38 UG/L (ref 20–75)

## 2022-12-21 ENCOUNTER — OFFICE VISIT (OUTPATIENT)
Dept: FAMILY MEDICINE | Facility: CLINIC | Age: 62
End: 2022-12-21
Payer: COMMERCIAL

## 2022-12-21 VITALS
SYSTOLIC BLOOD PRESSURE: 145 MMHG | BODY MASS INDEX: 30.21 KG/M2 | RESPIRATION RATE: 16 BRPM | WEIGHT: 211 LBS | TEMPERATURE: 97.7 F | HEART RATE: 59 BPM | DIASTOLIC BLOOD PRESSURE: 88 MMHG | HEIGHT: 70 IN | OXYGEN SATURATION: 100 %

## 2022-12-21 DIAGNOSIS — Z12.11 SCREEN FOR COLON CANCER: ICD-10-CM

## 2022-12-21 DIAGNOSIS — E78.00 PURE HYPERCHOLESTEROLEMIA: ICD-10-CM

## 2022-12-21 DIAGNOSIS — I10 ESSENTIAL HYPERTENSION: Primary | ICD-10-CM

## 2022-12-21 DIAGNOSIS — N18.32 STAGE 3B CHRONIC KIDNEY DISEASE (H): ICD-10-CM

## 2022-12-21 PROCEDURE — 91312 COVID-19 VACCINE BIVALENT BOOSTER 12+ (PFIZER): CPT | Performed by: FAMILY MEDICINE

## 2022-12-21 PROCEDURE — 0124A COVID-19 VACCINE BIVALENT BOOSTER 12+ (PFIZER): CPT | Performed by: FAMILY MEDICINE

## 2022-12-21 PROCEDURE — 90682 RIV4 VACC RECOMBINANT DNA IM: CPT | Performed by: FAMILY MEDICINE

## 2022-12-21 PROCEDURE — 90471 IMMUNIZATION ADMIN: CPT | Performed by: FAMILY MEDICINE

## 2022-12-21 PROCEDURE — 99214 OFFICE O/P EST MOD 30 MIN: CPT | Mod: 25 | Performed by: FAMILY MEDICINE

## 2022-12-21 RX ORDER — SIMVASTATIN 10 MG
10 TABLET ORAL AT BEDTIME
Qty: 90 TABLET | Refills: 3
Start: 2022-12-21

## 2022-12-21 NOTE — PATIENT INSTRUCTIONS
Charly,    Continue to monitor your blood pressure  Follow-up with the kidney specialist as we discussed  Resume your cholesterol medication  You may check laboratory testing again in 3 months  You received a COVID booster and influenza vaccine today    Derrell Souza MD

## 2022-12-21 NOTE — PROGRESS NOTES
Assessment & Plan     Essential hypertension    Blood pressure is elevated today  Continue losartan  Recommend checking his blood pressure at home if possible  Work on dietary and lifestyle changes and limit sodium  Follow-up to recheck blood pressure  Consider an adjustment if warranted    - Lipid panel reflex to direct LDL Fasting  - Basic metabolic panel    Pure hypercholesterolemia    Recommend resuming simvastatin  Check a lipid cascade and hepatic profile  Recheck cholesterol laboratory testing in 3 months    - simvastatin (ZOCOR) 10 MG tablet  Dispense: 90 tablet; Refill: 3    Stage 3b chronic kidney disease (H)    Reviewed the importance of good blood pressure control  Recommend avoiding NSAIDs and nephrotoxic substances  Continue losartan  Follow-up with nephrology    - Adult Nephrology  Referral    Screen for colon cancer    Will obtain his recent Cologuard test which is not currently available          Review of external notes as documented elsewhere in note               No follow-ups on file.    Derrell Souza MD  [facility]    Bear Parks is a 62 year old, presenting for the following health issues:  Recheck Medication (Hypertension follow up)    This is a pleasant 62-year-old male who presents to clinic for medication check.  He is overdue for a visit and has not been seen since April of 2021.  As noted, he has a history of hypertension as well as stage IIIb chronic kidney disease and hyperlipidemia.  He has followed up with nephrology in the past but has not followed up lately. He was assessed to have arteriosclerosis due to hypertension, dyslipidemia, and aging.  He typically has been compliant with his medications and is due for follow-up.  His most recent creatinine was 1.99 with a GFR of 35.    He is overdue for laboratory testing.    He has tried to remain physically active.  Exercise consists of walking his dog.  He reports he has not been  "taking his cholesterol medication recently.    He denies chest pain, shortness of breath, or palpitations.    History of Present Illness       CKD: He uses over the counter pain medication, including tylenol, a few times a month.    Hypertension: He presents for follow up of hypertension.  He does check blood pressure  regularly outside of the clinic. Outpatient blood pressures have not been over 140/90. He does not follow a low salt diet.               Review of Systems   Constitutional, HEENT, cardiovascular, pulmonary, GI, , musculoskeletal, neuro, skin, endocrine and psych systems are negative, except as otherwise noted.      Objective    BP (!) 161/84 (BP Location: Left arm, Patient Position: Sitting, Cuff Size: Adult Large)   Pulse 63   Temp 97.7  F (36.5  C) (Oral)   Resp 16   Ht 1.772 m (5' 9.75\")   Wt 95.7 kg (211 lb)   SpO2 100%   BMI 30.49 kg/m    Body mass index is 30.49 kg/m .  Physical Exam   GENERAL: healthy, alert and no distress  EYES: Eyes grossly normal to inspection, PERRL and conjunctivae and sclerae normal  RESP: lungs clear to auscultation - no rales, rhonchi or wheezes  CV: regular rate and rhythm, normal S1 S2, no S3 or S4, no murmur, click or rub, no peripheral edema and peripheral pulses strong  NEURO: Normal strength and tone, mentation intact and speech normal  PSYCH: mentation appears normal, affect normal/bright                    "

## 2023-08-12 ENCOUNTER — HEALTH MAINTENANCE LETTER (OUTPATIENT)
Age: 63
End: 2023-08-12

## 2023-08-30 ENCOUNTER — TRANSFERRED RECORDS (OUTPATIENT)
Dept: HEALTH INFORMATION MANAGEMENT | Facility: CLINIC | Age: 63
End: 2023-08-30
Payer: COMMERCIAL

## 2023-11-06 DIAGNOSIS — I10 ESSENTIAL HYPERTENSION: ICD-10-CM

## 2023-11-09 RX ORDER — LOSARTAN POTASSIUM 100 MG/1
TABLET ORAL
Qty: 90 TABLET | Refills: 0 | Status: SHIPPED | OUTPATIENT
Start: 2023-11-09 | End: 2024-02-08

## 2023-12-13 ENCOUNTER — LAB (OUTPATIENT)
Dept: LAB | Facility: CLINIC | Age: 63
End: 2023-12-13
Payer: COMMERCIAL

## 2023-12-13 DIAGNOSIS — I10 ESSENTIAL HYPERTENSION: ICD-10-CM

## 2023-12-13 LAB
ANION GAP SERPL CALCULATED.3IONS-SCNC: 8 MMOL/L (ref 7–15)
BUN SERPL-MCNC: 24.7 MG/DL (ref 8–23)
CALCIUM SERPL-MCNC: 9.4 MG/DL (ref 8.8–10.2)
CHLORIDE SERPL-SCNC: 105 MMOL/L (ref 98–107)
CHOLEST SERPL-MCNC: 186 MG/DL
CREAT SERPL-MCNC: 1.94 MG/DL (ref 0.67–1.17)
DEPRECATED HCO3 PLAS-SCNC: 26 MMOL/L (ref 22–29)
EGFRCR SERPLBLD CKD-EPI 2021: 38 ML/MIN/1.73M2
FASTING STATUS PATIENT QL REPORTED: YES
GLUCOSE SERPL-MCNC: 93 MG/DL (ref 70–99)
HDLC SERPL-MCNC: 39 MG/DL
LDLC SERPL CALC-MCNC: 125 MG/DL
NONHDLC SERPL-MCNC: 147 MG/DL
POTASSIUM SERPL-SCNC: 4.5 MMOL/L (ref 3.4–5.3)
SODIUM SERPL-SCNC: 139 MMOL/L (ref 135–145)
TRIGL SERPL-MCNC: 110 MG/DL

## 2023-12-13 PROCEDURE — 80048 BASIC METABOLIC PNL TOTAL CA: CPT

## 2023-12-13 PROCEDURE — 36415 COLL VENOUS BLD VENIPUNCTURE: CPT

## 2023-12-13 PROCEDURE — 80061 LIPID PANEL: CPT

## 2024-05-23 ENCOUNTER — TRANSFERRED RECORDS (OUTPATIENT)
Dept: HEALTH INFORMATION MANAGEMENT | Facility: CLINIC | Age: 64
End: 2024-05-23
Payer: COMMERCIAL

## 2024-06-04 ENCOUNTER — TRANSFERRED RECORDS (OUTPATIENT)
Dept: HEALTH INFORMATION MANAGEMENT | Facility: CLINIC | Age: 64
End: 2024-06-04
Payer: COMMERCIAL

## 2024-07-01 NOTE — PATIENT INSTRUCTIONS

## 2024-07-02 ENCOUNTER — OFFICE VISIT (OUTPATIENT)
Dept: FAMILY MEDICINE | Facility: CLINIC | Age: 64
End: 2024-07-02
Payer: COMMERCIAL

## 2024-07-02 VITALS
HEIGHT: 70 IN | BODY MASS INDEX: 30.46 KG/M2 | OXYGEN SATURATION: 97 % | RESPIRATION RATE: 18 BRPM | TEMPERATURE: 98.3 F | SYSTOLIC BLOOD PRESSURE: 139 MMHG | DIASTOLIC BLOOD PRESSURE: 85 MMHG | WEIGHT: 212.8 LBS | HEART RATE: 67 BPM

## 2024-07-02 DIAGNOSIS — N18.32 STAGE 3B CHRONIC KIDNEY DISEASE (H): ICD-10-CM

## 2024-07-02 DIAGNOSIS — I10 ESSENTIAL HYPERTENSION: ICD-10-CM

## 2024-07-02 DIAGNOSIS — Z01.818 PREOP GENERAL PHYSICAL EXAM: ICD-10-CM

## 2024-07-02 DIAGNOSIS — S46.011D TRAUMATIC COMPLETE TEAR OF RIGHT ROTATOR CUFF, SUBSEQUENT ENCOUNTER: ICD-10-CM

## 2024-07-02 DIAGNOSIS — E66.9 OBESITY WITHOUT SERIOUS COMORBIDITY, UNSPECIFIED CLASSIFICATION, UNSPECIFIED OBESITY TYPE: ICD-10-CM

## 2024-07-02 LAB
ALBUMIN SERPL BCG-MCNC: 4.3 G/DL (ref 3.5–5.2)
ALP SERPL-CCNC: 78 U/L (ref 40–150)
ALT SERPL W P-5'-P-CCNC: 41 U/L (ref 0–70)
ANION GAP SERPL CALCULATED.3IONS-SCNC: 9 MMOL/L (ref 7–15)
AST SERPL W P-5'-P-CCNC: 38 U/L (ref 0–45)
ATRIAL RATE - MUSE: 62 BPM
BILIRUB SERPL-MCNC: 0.5 MG/DL
BUN SERPL-MCNC: 25.8 MG/DL (ref 8–23)
CALCIUM SERPL-MCNC: 9.3 MG/DL (ref 8.8–10.2)
CHLORIDE SERPL-SCNC: 105 MMOL/L (ref 98–107)
CREAT SERPL-MCNC: 1.91 MG/DL (ref 0.67–1.17)
CREAT UR-MCNC: 94.4 MG/DL
DEPRECATED HCO3 PLAS-SCNC: 23 MMOL/L (ref 22–29)
DIASTOLIC BLOOD PRESSURE - MUSE: NORMAL MMHG
EGFRCR SERPLBLD CKD-EPI 2021: 39 ML/MIN/1.73M2
ERYTHROCYTE [DISTWIDTH] IN BLOOD BY AUTOMATED COUNT: 14.5 % (ref 10–15)
GLUCOSE SERPL-MCNC: 96 MG/DL (ref 70–99)
HCT VFR BLD AUTO: 43.3 % (ref 40–53)
HGB BLD-MCNC: 14.7 G/DL (ref 13.3–17.7)
INTERPRETATION ECG - MUSE: NORMAL
MCH RBC QN AUTO: 30.1 PG (ref 26.5–33)
MCHC RBC AUTO-ENTMCNC: 33.9 G/DL (ref 31.5–36.5)
MCV RBC AUTO: 89 FL (ref 78–100)
MICROALBUMIN UR-MCNC: 200 MG/L
MICROALBUMIN/CREAT UR: 211.86 MG/G CR (ref 0–17)
P AXIS - MUSE: 45 DEGREES
PLATELET # BLD AUTO: 146 10E3/UL (ref 150–450)
POTASSIUM SERPL-SCNC: 4.4 MMOL/L (ref 3.4–5.3)
PR INTERVAL - MUSE: 142 MS
PROT SERPL-MCNC: 7.1 G/DL (ref 6.4–8.3)
QRS DURATION - MUSE: 96 MS
QT - MUSE: 408 MS
QTC - MUSE: 414 MS
R AXIS - MUSE: -8 DEGREES
RBC # BLD AUTO: 4.89 10E6/UL (ref 4.4–5.9)
SODIUM SERPL-SCNC: 137 MMOL/L (ref 135–145)
SYSTOLIC BLOOD PRESSURE - MUSE: NORMAL MMHG
T AXIS - MUSE: 34 DEGREES
VENTRICULAR RATE- MUSE: 62 BPM
WBC # BLD AUTO: 5.8 10E3/UL (ref 4–11)

## 2024-07-02 PROCEDURE — 93010 ELECTROCARDIOGRAM REPORT: CPT | Performed by: INTERNAL MEDICINE

## 2024-07-02 PROCEDURE — 36415 COLL VENOUS BLD VENIPUNCTURE: CPT | Performed by: PHYSICIAN ASSISTANT

## 2024-07-02 PROCEDURE — 85027 COMPLETE CBC AUTOMATED: CPT | Performed by: PHYSICIAN ASSISTANT

## 2024-07-02 PROCEDURE — 80053 COMPREHEN METABOLIC PANEL: CPT | Performed by: PHYSICIAN ASSISTANT

## 2024-07-02 PROCEDURE — 82043 UR ALBUMIN QUANTITATIVE: CPT | Performed by: PHYSICIAN ASSISTANT

## 2024-07-02 PROCEDURE — 82570 ASSAY OF URINE CREATININE: CPT | Performed by: PHYSICIAN ASSISTANT

## 2024-07-02 PROCEDURE — 99214 OFFICE O/P EST MOD 30 MIN: CPT | Mod: 25 | Performed by: PHYSICIAN ASSISTANT

## 2024-07-02 PROCEDURE — 90715 TDAP VACCINE 7 YRS/> IM: CPT | Performed by: PHYSICIAN ASSISTANT

## 2024-07-02 PROCEDURE — 93005 ELECTROCARDIOGRAM TRACING: CPT | Performed by: PHYSICIAN ASSISTANT

## 2024-07-02 PROCEDURE — 90471 IMMUNIZATION ADMIN: CPT | Performed by: PHYSICIAN ASSISTANT

## 2024-07-02 RX ORDER — LOSARTAN POTASSIUM 100 MG/1
100 TABLET ORAL DAILY
Qty: 90 TABLET | Refills: 1 | Status: SHIPPED | OUTPATIENT
Start: 2024-07-02

## 2024-07-02 NOTE — PROGRESS NOTES
Preoperative Evaluation  [facility]  [address] [facility] [address]  Phone: [phone]  Fax: [phone]  Primary Provider: Derrell Souza MD  Pre-op Performing Provider: Paty Barr PA-C  Jul 2, 2024 7/1/2024   Surgical Information   What procedure is being done? Repair torn rotator cuff   Facility or Hospital where procedure/surgery will be performed: [facility]   Who is doing the procedure / surgery? Ronaldo Comfort   Date of surgery / procedure: 7/12/2024   Time of surgery / procedure: Time not yet available   Where do you plan to recover after surgery? at home with family        Fax number for surgical facility: [facility]    Assessment & Plan     The proposed surgical procedure is considered INTERMEDIATE risk.    Preop general physical exam  Traumatic complete tear of right rotator cuff, subsequent encounter  Patient here today for rotator cuff repair, fell during vertigo episode and has torn rotator cuff.  - Comprehensive metabolic panel (BMP + Alb, Alk Phos, ALT, AST, Total. Bili, TP)  - CBC with platelets  - TDAP 7+ (ADACEL,BOOSTRIX)  - EKG 12-lead, tracing only  - Comprehensive metabolic panel (BMP + Alb, Alk Phos, ALT, AST, Total. Bili, TP)  - CBC with platelets    Essential hypertension  Chronic issue, BP stable on Cozaar 100mg daily.  - losartan (COZAAR) 100 MG tablet  Dispense: 90 tablet; Refill: 1    Stage 3b chronic kidney disease (H)  Chronic issue, urine micro updated today.  - Albumin Random Urine Quantitative with Creat Ratio  - Albumin Random Urine Quantitative with Creat Ratio    Obesity without serious comorbidity, unspecified classification, unspecified obesity type  Chronic issue, work on diet and exercise.              - No identified additional risk factors other than previously addressed    Preoperative Medication Instructions  Antiplatelet or Anticoagulation Medication  Instructions   - Patient is on no antiplatelet or anticoagulation medications.    Additional Medication Instructions  Take all scheduled medications on the day of surgery    Hold any aspirin, Ibuprofen, Aleve 7 days prior to surgery.  Tylenol is okay for pain.    Recommendation  Approval given to proceed with proposed procedure, without further diagnostic evaluation.    Bear Parks is a 64 year old, presenting for the following:  Pre-Op Exam (Right shoulder replacement on 07/12)          7/2/2024     8:08 AM   Additional Questions   Roomed by LUCERO Feng related to upcoming procedure: patient had vertigo        7/1/2024   Pre-Op Questionnaire   Have you ever had a heart attack or stroke? No   Have you ever had surgery on your heart or blood vessels, such as a stent placement, a coronary artery bypass, or surgery on an artery in your head, neck, heart, or legs? No   Do you have chest pain with activity? No   Do you have a history of heart failure? No   Do you currently have a cold, bronchitis or symptoms of other infection? No   Do you have a cough, shortness of breath, or wheezing? No   Do you or anyone in your family have previous history of blood clots? No   Do you or does anyone in your family have a serious bleeding problem such as prolonged bleeding following surgeries or cuts? No   Have you ever had problems with anemia or been told to take iron pills? No   Have you had any abnormal blood loss such as black, tarry or bloody stools? No   Have you ever had a blood transfusion? No   Are you willing to have a blood transfusion if it is medically needed before, during, or after your surgery? Yes   Have you or any of your relatives ever had problems with anesthesia? No   Do you have sleep apnea, excessive snoring or daytime drowsiness? No   Do you have any artifical heart valves or other implanted medical devices like a pacemaker, defibrillator, or continuous glucose monitor? No   Do you have  artificial joints? No   Are you allergic to latex? No        Health Care Directive  Patient does not have a Health Care Directive or Living Will: Discussed advance care planning with patient; information given to patient to review.    Preoperative Review of    reviewed - no record of controlled substances prescribed.      Status of Chronic Conditions:  See problem list for active medical problems.  Problems all longstanding and stable, except as noted/documented.  See ROS for pertinent symptoms related to these conditions.    Patient Active Problem List    Diagnosis Date Noted    Stage 3b chronic kidney disease (H)      Priority: Medium     Created by Conversion        Splenic mass 10/23/2019     Priority: Medium    Lesion of both native kidneys 10/23/2019     Priority: Medium    Left inguinal hernia 02/07/2018     Priority: Medium     Added automatically from request for surgery [id number]        Nephrosclerosis      Priority: Medium     Created by Conversion        Essential Hypertension      Priority: Medium     Created by Conversion  Replacement Utility updated for latest IMO load        Essential Hypercholesterolemia      Priority: Medium     Created by Conversion        Esophageal Reflux      Priority: Medium     Created by Conversion        Chronic Renal Insufficiency      Priority: Medium     Created by Conversion          Past Medical History:   Diagnosis Date    Chronic kidney disease     Stage 3    Hypertension      Past Surgical History:   Procedure Laterality Date    LAPAROSCOPIC HERNIORRHAPHY INGUINAL Left 2/28/2018    Procedure: REPAIR, HERNIA, INGUINAL, LAPAROSCOPIC;  Surgeon: Ryan Dempsey MD;  Location: [facility];  Service:     OTHER SURGICAL HISTORY Left     Left inguinal hernia surgery    WISDOM TOOTH EXTRACTION       Current Outpatient Medications   Medication Sig Dispense Refill    losartan (COZAAR) 100 MG tablet Take one PO Qday. An appointment is due 90 tablet 0    simvastatin  "(ZOCOR) 10 MG tablet Take 1 tablet (10 mg) by mouth At Bedtime 90 tablet 3       No Known Allergies     Social History     Tobacco Use    Smoking status: Never    Smokeless tobacco: Never   Substance Use Topics    Alcohol use: Yes     Comment: Alcoholic Drinks/day: Occasionally     History   Drug Use No             Review of Systems  Constitutional, HEENT, cardiovascular, pulmonary, gi and gu systems are negative, except as otherwise noted.    Objective    /85 (BP Location: Left arm, Patient Position: Sitting, Cuff Size: Adult Large)   Pulse 67   Temp 98.3  F (36.8  C) (Oral)   Resp 18   Ht 1.772 m (5' 9.75\")   Wt 96.5 kg (212 lb 12.8 oz)   SpO2 97%   BMI 30.75 kg/m     Estimated body mass index is 30.75 kg/m  as calculated from the following:    Height as of this encounter: 1.772 m (5' 9.75\").    Weight as of this encounter: 96.5 kg (212 lb 12.8 oz).  Physical Exam  GENERAL: alert and no distress  NECK: no adenopathy, no asymmetry, masses, or scars  RESP: lungs clear to auscultation - no rales, rhonchi or wheezes  CV: regular rate and rhythm, normal S1 S2, no S3 or S4, no murmur, click or rub, no peripheral edema  ABDOMEN: soft, nontender, no hepatosplenomegaly, no masses and bowel sounds normal  MS: no gross musculoskeletal defects noted, no edema    Recent Labs   Lab Test 12/13/23  0734      POTASSIUM 4.5   CR 1.94*        Diagnostics  Labs pending at this time.  Results will be reviewed when available.   EKG: appears normal, NSR, normal axis, normal intervals, no acute ST/T changes c/w ischemia, no LVH by voltage criteria, unchanged from previous tracings    Revised Cardiac Risk Index (RCRI)  The patient has the following serious cardiovascular risks for perioperative complications:   - No serious cardiac risks = 0 points     RCRI Interpretation: 0 points: Class I (very low risk - 0.4% complication rate)         Signed Electronically by: Paty Barr PA-C  Copy of this evaluation " report is provided to requesting physician.

## 2024-07-10 ENCOUNTER — ANESTHESIA EVENT (OUTPATIENT)
Dept: SURGERY | Facility: CLINIC | Age: 64
End: 2024-07-10
Payer: COMMERCIAL

## 2024-07-11 NOTE — ANESTHESIA PREPROCEDURE EVALUATION
Anesthesia Pre-Procedure Evaluation    Patient: Daniel Le   MRN: [id number] : 1960        Procedure : Procedure(s):  Shoulder Arthroscopy,Subacromial Decomprssion,Acromioclavicular Resection,Mini Open Biceps Tenodesis          Past Medical History:   Diagnosis Date     Chronic kidney disease     Stage 3     Hypertension       Past Surgical History:   Procedure Laterality Date     LAPAROSCOPIC HERNIORRHAPHY INGUINAL Left 2018    Procedure: REPAIR, HERNIA, INGUINAL, LAPAROSCOPIC;  Surgeon: Ryan Dempsey MD;  Location: [facility];  Service:      OTHER SURGICAL HISTORY Left     Left inguinal hernia surgery     WISDOM TOOTH EXTRACTION        No Known Allergies   Social History     Tobacco Use     Smoking status: Never     Smokeless tobacco: Never   Substance Use Topics     Alcohol use: Yes     Comment: Alcoholic Drinks/day: Occasionally      Wt Readings from Last 1 Encounters:   24 96.5 kg (212 lb 12.8 oz)        Anesthesia Evaluation   Pt has had prior anesthetic. Type: MAC and General.        ROS/MED HX  ENT/Pulmonary:  - neg pulmonary ROS     Neurologic:  - neg neurologic ROS     Cardiovascular:     (+) Dyslipidemia hypertension- -   -  - -                                 Previous cardiac testing   Echo: Date: Results:    Stress Test:  Date: Results:    ECG Reviewed:  Date: 24 Results:    Sinus rhythm  Minimal voltage criteria for LVH, may be normal variant  Borderline ECG  When compared with ECG of 2018 16:06,  Nonspecific T wave abnormality has replaced inverted T waves in Inferior leads      Cath:  Date: Results:      METS/Exercise Tolerance:     Hematologic:  - neg hematologic  ROS     Musculoskeletal:   (+)  arthritis,             GI/Hepatic:     (+) GERD,                   Renal/Genitourinary:     (+) renal disease, type: CRI,            Endo:     (+)               Obesity,       Psychiatric/Substance Use:  - neg psychiatric ROS     Infectious Disease:  - neg  "infectious disease ROS     Malignancy:  - neg malignancy ROS     Other:  - neg other ROS          Physical Exam    Airway        Mallampati: II   TM distance: > 3 FB   Neck ROM: full   Mouth opening: > 3 cm    Respiratory Devices and Support         Dental           Cardiovascular   cardiovascular exam normal          Pulmonary   pulmonary exam normal            OUTSIDE LABS:  CBC:   Lab Results   Component Value Date    WBC 5.8 07/02/2024    WBC 5.4 12/14/2022    HGB 14.7 07/02/2024    HGB 15.3 12/14/2022    HCT 43.3 07/02/2024    HCT 44.8 12/14/2022     (L) 07/02/2024     (L) 12/14/2022     BMP:   Lab Results   Component Value Date     07/02/2024     12/13/2023    POTASSIUM 4.4 07/02/2024    POTASSIUM 4.5 12/13/2023    CHLORIDE 105 07/02/2024    CHLORIDE 105 12/13/2023    CO2 23 07/02/2024    CO2 26 12/13/2023    BUN 25.8 (H) 07/02/2024    BUN 24.7 (H) 12/13/2023    CR 1.91 (H) 07/02/2024    CR 1.94 (H) 12/13/2023    GLC 96 07/02/2024    GLC 93 12/13/2023     COAGS: No results found for: \"PTT\", \"INR\", \"FIBR\"  POC: No results found for: \"BGM\", \"HCG\", \"HCGS\"  HEPATIC:   Lab Results   Component Value Date    ALBUMIN 4.3 07/02/2024    PROTTOTAL 7.1 07/02/2024    ALT 41 07/02/2024    AST 38 07/02/2024    ALKPHOS 78 07/02/2024    BILITOTAL 0.5 07/02/2024     OTHER:   Lab Results   Component Value Date    A1C 5.1 06/03/2019    BLAINE 9.3 07/02/2024    PHOS 2.6 06/03/2019       Anesthesia Plan    ASA Status:  3       Anesthesia Type: General.     - Airway: ETT   Induction: Propofol.   Maintenance: Balanced.        Consents    Anesthesia Plan(s) and associated risks, benefits, and realistic alternatives discussed. Questions answered and patient/representative(s) expressed understanding.     - Discussed: Risks, Benefits and Alternatives for BOTH SEDATION and the PROCEDURE were discussed     - Discussed with:  Patient            Postoperative Care    Pain management: IV analgesics, Oral pain " "medications, Peripheral nerve block (Single Shot), Multi-modal analgesia.   PONV prophylaxis: Ondansetron (or other 5HT-3), Dexamethasone or Solumedrol     Comments:               JUANA Bell CRNA    I have reviewed the pertinent notes and labs in the chart from the past 30 days and (re)examined the patient.  Any updates or changes from those notes are reflected in this note.              # Obesity: Estimated body mass index is 30.75 kg/m  as calculated from the following:    Height as of 7/2/24: 1.772 m (5' 9.75\").    Weight as of 7/2/24: 96.5 kg (212 lb 12.8 oz).      "

## 2024-07-17 ENCOUNTER — ANESTHESIA (OUTPATIENT)
Dept: SURGERY | Facility: CLINIC | Age: 64
End: 2024-07-17
Payer: COMMERCIAL

## 2024-07-17 ENCOUNTER — HOSPITAL ENCOUNTER (OUTPATIENT)
Facility: CLINIC | Age: 64
Discharge: HOME | End: 2024-07-17
Attending: ORTHOPAEDIC SURGERY | Admitting: ORTHOPAEDIC SURGERY
Payer: COMMERCIAL

## 2024-07-17 VITALS
HEART RATE: 62 BPM | DIASTOLIC BLOOD PRESSURE: 78 MMHG | SYSTOLIC BLOOD PRESSURE: 120 MMHG | WEIGHT: 215 LBS | TEMPERATURE: 96.6 F | RESPIRATION RATE: 16 BRPM | HEIGHT: 69 IN | BODY MASS INDEX: 31.84 KG/M2 | OXYGEN SATURATION: 94 %

## 2024-07-17 DIAGNOSIS — S46.011A TRAUMATIC COMPLETE TEAR OF RIGHT ROTATOR CUFF, INITIAL ENCOUNTER: Primary | ICD-10-CM

## 2024-07-17 LAB
CREAT SERPL-MCNC: 1.9 MG/DL (ref 0.67–1.17)
EGFRCR SERPLBLD CKD-EPI 2021: 39 ML/MIN/1.73M2
ERYTHROCYTE [DISTWIDTH] IN BLOOD BY AUTOMATED COUNT: 14.4 % (ref 10–15)
HCT VFR BLD AUTO: 44 % (ref 40–53)
HGB BLD-MCNC: 15.1 G/DL (ref 13.3–17.7)
MCH RBC QN AUTO: 30.2 PG (ref 26.5–33)
MCHC RBC AUTO-ENTMCNC: 34.3 G/DL (ref 31.5–36.5)
MCV RBC AUTO: 88 FL (ref 78–100)
PLATELET # BLD AUTO: 157 10E3/UL (ref 150–450)
POTASSIUM SERPL-SCNC: 4.4 MMOL/L (ref 3.4–5.3)
RBC # BLD AUTO: 5 10E6/UL (ref 4.4–5.9)
WBC # BLD AUTO: 6.4 10E3/UL (ref 4–11)

## 2024-07-17 PROCEDURE — 271N000001 HC OR GENERAL SUPPLY NON-STERILE: Performed by: ORTHOPAEDIC SURGERY

## 2024-07-17 PROCEDURE — 250N000011 HC RX IP 250 OP 636: Performed by: NURSE ANESTHETIST, CERTIFIED REGISTERED

## 2024-07-17 PROCEDURE — 250N000013 HC RX MED GY IP 250 OP 250 PS 637: Performed by: PHYSICIAN ASSISTANT

## 2024-07-17 PROCEDURE — 82565 ASSAY OF CREATININE: CPT | Performed by: PHYSICIAN ASSISTANT

## 2024-07-17 PROCEDURE — 250N000009 HC RX 250: Performed by: ORTHOPAEDIC SURGERY

## 2024-07-17 PROCEDURE — 85027 COMPLETE CBC AUTOMATED: CPT | Performed by: PHYSICIAN ASSISTANT

## 2024-07-17 PROCEDURE — 258N000003 HC RX IP 258 OP 636: Performed by: NURSE ANESTHETIST, CERTIFIED REGISTERED

## 2024-07-17 PROCEDURE — 250N000025 HC SEVOFLURANE, PER MIN: Performed by: ORTHOPAEDIC SURGERY

## 2024-07-17 PROCEDURE — 710N000012 HC RECOVERY PHASE 2, PER MINUTE: Performed by: ORTHOPAEDIC SURGERY

## 2024-07-17 PROCEDURE — 250N000009 HC RX 250

## 2024-07-17 PROCEDURE — 370N000017 HC ANESTHESIA TECHNICAL FEE, PER MIN: Performed by: ORTHOPAEDIC SURGERY

## 2024-07-17 PROCEDURE — 999N000141 HC STATISTIC PRE-PROCEDURE NURSING ASSESSMENT: Performed by: ORTHOPAEDIC SURGERY

## 2024-07-17 PROCEDURE — 250N000013 HC RX MED GY IP 250 OP 250 PS 637: Performed by: NURSE ANESTHETIST, CERTIFIED REGISTERED

## 2024-07-17 PROCEDURE — 258N000003 HC RX IP 258 OP 636

## 2024-07-17 PROCEDURE — 250N000009 HC RX 250: Performed by: NURSE ANESTHETIST, CERTIFIED REGISTERED

## 2024-07-17 PROCEDURE — 250N000011 HC RX IP 250 OP 636: Performed by: PHYSICIAN ASSISTANT

## 2024-07-17 PROCEDURE — 84132 ASSAY OF SERUM POTASSIUM: CPT | Performed by: PHYSICIAN ASSISTANT

## 2024-07-17 PROCEDURE — 250N000011 HC RX IP 250 OP 636: Performed by: ORTHOPAEDIC SURGERY

## 2024-07-17 PROCEDURE — 36415 COLL VENOUS BLD VENIPUNCTURE: CPT | Performed by: PHYSICIAN ASSISTANT

## 2024-07-17 PROCEDURE — 360N000077 HC SURGERY LEVEL 4, PER MIN: Performed by: ORTHOPAEDIC SURGERY

## 2024-07-17 PROCEDURE — 710N000009 HC RECOVERY PHASE 1, LEVEL 1, PER MIN: Performed by: ORTHOPAEDIC SURGERY

## 2024-07-17 PROCEDURE — C9290 INJ, BUPIVACAINE LIPOSOME: HCPCS | Performed by: NURSE ANESTHETIST, CERTIFIED REGISTERED

## 2024-07-17 PROCEDURE — 272N000001 HC OR GENERAL SUPPLY STERILE: Performed by: ORTHOPAEDIC SURGERY

## 2024-07-17 PROCEDURE — C1713 ANCHOR/SCREW BN/BN,TIS/BN: HCPCS | Performed by: ORTHOPAEDIC SURGERY

## 2024-07-17 DEVICE — 4.75MM BC KNOTLESS SWIVELOCK W/ TAPE
Type: IMPLANTABLE DEVICE | Site: SHOULDER | Status: FUNCTIONAL
Brand: ARTHREX®

## 2024-07-17 DEVICE — BIO-COMP SWVLK C, CLD 4.75X19.1MM
Type: IMPLANTABLE DEVICE | Site: SHOULDER | Status: FUNCTIONAL
Brand: ARTHREX®

## 2024-07-17 RX ORDER — HYDROXYZINE HYDROCHLORIDE 50 MG/1
50 TABLET, FILM COATED ORAL EVERY 6 HOURS PRN
Status: DISCONTINUED | OUTPATIENT
Start: 2024-07-17 | End: 2024-07-17 | Stop reason: HOSPADM

## 2024-07-17 RX ORDER — ONDANSETRON 4 MG/1
4 TABLET, ORALLY DISINTEGRATING ORAL EVERY 30 MIN PRN
Status: DISCONTINUED | OUTPATIENT
Start: 2024-07-17 | End: 2024-07-17 | Stop reason: HOSPADM

## 2024-07-17 RX ORDER — ACETAMINOPHEN 325 MG/1
975 TABLET ORAL ONCE
Status: COMPLETED | OUTPATIENT
Start: 2024-07-17 | End: 2024-07-17

## 2024-07-17 RX ORDER — FENTANYL CITRATE 50 UG/ML
25 INJECTION, SOLUTION INTRAMUSCULAR; INTRAVENOUS EVERY 5 MIN PRN
Status: DISCONTINUED | OUTPATIENT
Start: 2024-07-17 | End: 2024-07-17 | Stop reason: HOSPADM

## 2024-07-17 RX ORDER — ONDANSETRON 2 MG/ML
4 INJECTION INTRAMUSCULAR; INTRAVENOUS EVERY 30 MIN PRN
Status: DISCONTINUED | OUTPATIENT
Start: 2024-07-17 | End: 2024-07-17 | Stop reason: HOSPADM

## 2024-07-17 RX ORDER — DEXAMETHASONE SODIUM PHOSPHATE 4 MG/ML
4 INJECTION, SOLUTION INTRA-ARTICULAR; INTRALESIONAL; INTRAMUSCULAR; INTRAVENOUS; SOFT TISSUE
Status: DISCONTINUED | OUTPATIENT
Start: 2024-07-17 | End: 2024-07-17 | Stop reason: HOSPADM

## 2024-07-17 RX ORDER — GLYCOPYRROLATE 0.2 MG/ML
INJECTION, SOLUTION INTRAMUSCULAR; INTRAVENOUS PRN
Status: DISCONTINUED | OUTPATIENT
Start: 2024-07-17 | End: 2024-07-17

## 2024-07-17 RX ORDER — BUPIVACAINE HYDROCHLORIDE 5 MG/ML
INJECTION, SOLUTION EPIDURAL; INTRACAUDAL
Status: COMPLETED | OUTPATIENT
Start: 2024-07-17 | End: 2024-07-17

## 2024-07-17 RX ORDER — HYDROMORPHONE HCL IN WATER/PF 6 MG/30 ML
0.2 PATIENT CONTROLLED ANALGESIA SYRINGE INTRAVENOUS EVERY 5 MIN PRN
Status: DISCONTINUED | OUTPATIENT
Start: 2024-07-17 | End: 2024-07-17 | Stop reason: HOSPADM

## 2024-07-17 RX ORDER — NALOXONE HYDROCHLORIDE 0.4 MG/ML
0.1 INJECTION, SOLUTION INTRAMUSCULAR; INTRAVENOUS; SUBCUTANEOUS
Status: DISCONTINUED | OUTPATIENT
Start: 2024-07-17 | End: 2024-07-17 | Stop reason: HOSPADM

## 2024-07-17 RX ORDER — FENTANYL CITRATE 50 UG/ML
50 INJECTION, SOLUTION INTRAMUSCULAR; INTRAVENOUS EVERY 5 MIN PRN
Status: DISCONTINUED | OUTPATIENT
Start: 2024-07-17 | End: 2024-07-17 | Stop reason: HOSPADM

## 2024-07-17 RX ORDER — AMOXICILLIN 250 MG
1-2 CAPSULE ORAL 2 TIMES DAILY
Qty: 30 TABLET | Refills: 0 | Status: SHIPPED | OUTPATIENT
Start: 2024-07-17

## 2024-07-17 RX ORDER — ONDANSETRON 4 MG/1
4 TABLET, ORALLY DISINTEGRATING ORAL
Status: DISCONTINUED | OUTPATIENT
Start: 2024-07-17 | End: 2024-07-17 | Stop reason: HOSPADM

## 2024-07-17 RX ORDER — LIDOCAINE HYDROCHLORIDE 20 MG/ML
INJECTION, SOLUTION INFILTRATION; PERINEURAL PRN
Status: DISCONTINUED | OUTPATIENT
Start: 2024-07-17 | End: 2024-07-17

## 2024-07-17 RX ORDER — PROPOFOL 10 MG/ML
INJECTION, EMULSION INTRAVENOUS PRN
Status: DISCONTINUED | OUTPATIENT
Start: 2024-07-17 | End: 2024-07-17

## 2024-07-17 RX ORDER — OXYCODONE HYDROCHLORIDE 5 MG/1
5-10 TABLET ORAL EVERY 4 HOURS PRN
Qty: 30 TABLET | Refills: 0 | Status: SHIPPED | OUTPATIENT
Start: 2024-07-17

## 2024-07-17 RX ORDER — ONDANSETRON 2 MG/ML
INJECTION INTRAMUSCULAR; INTRAVENOUS PRN
Status: DISCONTINUED | OUTPATIENT
Start: 2024-07-17 | End: 2024-07-17

## 2024-07-17 RX ORDER — DEXAMETHASONE SODIUM PHOSPHATE 4 MG/ML
INJECTION, SOLUTION INTRA-ARTICULAR; INTRALESIONAL; INTRAMUSCULAR; INTRAVENOUS; SOFT TISSUE PRN
Status: DISCONTINUED | OUTPATIENT
Start: 2024-07-17 | End: 2024-07-17

## 2024-07-17 RX ORDER — CEFAZOLIN SODIUM/WATER 2 G/20 ML
2 SYRINGE (ML) INTRAVENOUS SEE ADMIN INSTRUCTIONS
Status: DISCONTINUED | OUTPATIENT
Start: 2024-07-17 | End: 2024-07-17 | Stop reason: HOSPADM

## 2024-07-17 RX ORDER — SODIUM CHLORIDE, SODIUM LACTATE, POTASSIUM CHLORIDE, CALCIUM CHLORIDE 600; 310; 30; 20 MG/100ML; MG/100ML; MG/100ML; MG/100ML
INJECTION, SOLUTION INTRAVENOUS CONTINUOUS
Status: DISCONTINUED | OUTPATIENT
Start: 2024-07-17 | End: 2024-07-17 | Stop reason: HOSPADM

## 2024-07-17 RX ORDER — IBUPROFEN 600 MG/1
600 TABLET, FILM COATED ORAL
Status: DISCONTINUED | OUTPATIENT
Start: 2024-07-17 | End: 2024-07-17 | Stop reason: HOSPADM

## 2024-07-17 RX ORDER — HYDROXYZINE HYDROCHLORIDE 25 MG/1
25 TABLET, FILM COATED ORAL EVERY 6 HOURS PRN
Status: DISCONTINUED | OUTPATIENT
Start: 2024-07-17 | End: 2024-07-17 | Stop reason: HOSPADM

## 2024-07-17 RX ORDER — LIDOCAINE 40 MG/G
CREAM TOPICAL
Status: DISCONTINUED | OUTPATIENT
Start: 2024-07-17 | End: 2024-07-17 | Stop reason: HOSPADM

## 2024-07-17 RX ORDER — FENTANYL CITRATE 50 UG/ML
INJECTION, SOLUTION INTRAMUSCULAR; INTRAVENOUS PRN
Status: DISCONTINUED | OUTPATIENT
Start: 2024-07-17 | End: 2024-07-17

## 2024-07-17 RX ORDER — HYDROMORPHONE HCL IN WATER/PF 6 MG/30 ML
0.4 PATIENT CONTROLLED ANALGESIA SYRINGE INTRAVENOUS EVERY 5 MIN PRN
Status: DISCONTINUED | OUTPATIENT
Start: 2024-07-17 | End: 2024-07-17 | Stop reason: HOSPADM

## 2024-07-17 RX ORDER — MEPERIDINE HYDROCHLORIDE 25 MG/ML
12.5 INJECTION INTRAMUSCULAR; INTRAVENOUS; SUBCUTANEOUS EVERY 5 MIN PRN
Status: DISCONTINUED | OUTPATIENT
Start: 2024-07-17 | End: 2024-07-17 | Stop reason: HOSPADM

## 2024-07-17 RX ORDER — CEFAZOLIN SODIUM/WATER 2 G/20 ML
2 SYRINGE (ML) INTRAVENOUS
Status: COMPLETED | OUTPATIENT
Start: 2024-07-17 | End: 2024-07-17

## 2024-07-17 RX ORDER — ONDANSETRON 4 MG/1
4 TABLET, ORALLY DISINTEGRATING ORAL EVERY 8 HOURS PRN
Qty: 4 TABLET | Refills: 0 | Status: SHIPPED | OUTPATIENT
Start: 2024-07-17

## 2024-07-17 RX ADMIN — HYDROXYZINE HYDROCHLORIDE 25 MG: 25 TABLET ORAL at 15:10

## 2024-07-17 RX ADMIN — FENTANYL CITRATE 50 MCG: 50 INJECTION INTRAMUSCULAR; INTRAVENOUS at 10:44

## 2024-07-17 RX ADMIN — BUPIVACAINE 10 ML: 13.3 INJECTION, SUSPENSION, LIPOSOMAL INFILTRATION at 10:55

## 2024-07-17 RX ADMIN — PHENYLEPHRINE HYDROCHLORIDE 100 MCG: 10 INJECTION INTRAVENOUS at 12:03

## 2024-07-17 RX ADMIN — GLYCOPYRROLATE 0.2 MG: 0.2 INJECTION, SOLUTION INTRAMUSCULAR; INTRAVENOUS at 12:22

## 2024-07-17 RX ADMIN — BUPIVACAINE HYDROCHLORIDE 10 ML: 5 INJECTION, SOLUTION EPIDURAL; INTRACAUDAL; PERINEURAL at 10:55

## 2024-07-17 RX ADMIN — PROPOFOL 200 MG: 10 INJECTION, EMULSION INTRAVENOUS at 11:45

## 2024-07-17 RX ADMIN — PHENYLEPHRINE HYDROCHLORIDE 200 MCG: 10 INJECTION INTRAVENOUS at 11:56

## 2024-07-17 RX ADMIN — DEXAMETHASONE SODIUM PHOSPHATE 4 MG: 4 INJECTION, SOLUTION INTRA-ARTICULAR; INTRALESIONAL; INTRAMUSCULAR; INTRAVENOUS; SOFT TISSUE at 11:45

## 2024-07-17 RX ADMIN — MIDAZOLAM 2 MG: 1 INJECTION INTRAMUSCULAR; INTRAVENOUS at 11:41

## 2024-07-17 RX ADMIN — GLYCOPYRROLATE 0.1 MG: 0.2 INJECTION, SOLUTION INTRAMUSCULAR; INTRAVENOUS at 11:41

## 2024-07-17 RX ADMIN — SODIUM CHLORIDE, POTASSIUM CHLORIDE, SODIUM LACTATE AND CALCIUM CHLORIDE: 600; 310; 30; 20 INJECTION, SOLUTION INTRAVENOUS at 10:25

## 2024-07-17 RX ADMIN — PHENYLEPHRINE HYDROCHLORIDE 0.7 MCG/KG/MIN: 10 INJECTION INTRAVENOUS at 12:52

## 2024-07-17 RX ADMIN — SUGAMMADEX 200 MG: 100 INJECTION, SOLUTION INTRAVENOUS at 13:51

## 2024-07-17 RX ADMIN — FENTANYL CITRATE 50 MCG: 50 INJECTION INTRAMUSCULAR; INTRAVENOUS at 11:45

## 2024-07-17 RX ADMIN — ROCURONIUM BROMIDE 50 MG: 50 INJECTION, SOLUTION INTRAVENOUS at 11:45

## 2024-07-17 RX ADMIN — PHENYLEPHRINE HYDROCHLORIDE 100 MCG: 10 INJECTION INTRAVENOUS at 12:06

## 2024-07-17 RX ADMIN — FENTANYL CITRATE 50 MCG: 50 INJECTION INTRAMUSCULAR; INTRAVENOUS at 13:41

## 2024-07-17 RX ADMIN — LIDOCAINE HYDROCHLORIDE 5 ML: 20 INJECTION, SOLUTION INFILTRATION; PERINEURAL at 11:45

## 2024-07-17 RX ADMIN — FENTANYL CITRATE 50 MCG: 50 INJECTION INTRAMUSCULAR; INTRAVENOUS at 14:36

## 2024-07-17 RX ADMIN — GLYCOPYRROLATE 0.1 MG: 0.2 INJECTION, SOLUTION INTRAMUSCULAR; INTRAVENOUS at 11:45

## 2024-07-17 RX ADMIN — FENTANYL CITRATE 50 MCG: 50 INJECTION INTRAMUSCULAR; INTRAVENOUS at 14:28

## 2024-07-17 RX ADMIN — ONDANSETRON 4 MG: 2 INJECTION INTRAMUSCULAR; INTRAVENOUS at 13:49

## 2024-07-17 RX ADMIN — LIDOCAINE HYDROCHLORIDE 0.1 ML: 10 INJECTION, SOLUTION EPIDURAL; INFILTRATION; INTRACAUDAL; PERINEURAL at 10:26

## 2024-07-17 RX ADMIN — PHENYLEPHRINE HYDROCHLORIDE 0.1 MCG/KG/MIN: 10 INJECTION INTRAVENOUS at 12:03

## 2024-07-17 RX ADMIN — ACETAMINOPHEN 975 MG: 325 TABLET, FILM COATED ORAL at 10:18

## 2024-07-17 RX ADMIN — MIDAZOLAM 2 MG: 1 INJECTION INTRAMUSCULAR; INTRAVENOUS at 10:44

## 2024-07-17 RX ADMIN — Medication 2 G: at 11:41

## 2024-07-17 RX ADMIN — FENTANYL CITRATE 50 MCG: 50 INJECTION INTRAMUSCULAR; INTRAVENOUS at 10:46

## 2024-07-17 ASSESSMENT — ACTIVITIES OF DAILY LIVING (ADL)
ADLS_ACUITY_SCORE: 22

## 2024-07-17 NOTE — ANESTHESIA PROCEDURE NOTES
Airway       Patient location during procedure: OR       Procedure Start/Stop Times: 7/17/2024 11:49 AM  Staff -        CRNA: Lanette Yost APRN CRNA       Performed By: CRNA  Consent for Airway        Urgency: elective  Indications and Patient Condition       Indications for airway management: velasquez-procedural       Induction type:intravenous       Mask difficulty assessment: 2 - vent by mask + OA or adjuvant +/- NMBA    Final Airway Details       Final airway type: endotracheal airway       Successful airway: ETT - single  Endotracheal Airway Details        ETT size (mm): 8.0       Cuffed: yes       Successful intubation technique: video laryngoscopy       VL Blade Size: Maria 4       Grade View of Cords: 1       Adjucts: stylet       Position: Right       Measured from: gums/teeth       Secured at (cm): 24       Bite block used: None    Post intubation assessment        Placement verified by: capnometry, equal breath sounds and chest rise        Number of attempts at approach: 1       Number of other approaches attempted: 0       Secured with: tape       Ease of procedure: easy       Dentition: Intact    Medication(s) Administered   Medication Administration Time: 7/17/2024 11:49 AM

## 2024-07-17 NOTE — DISCHARGE INSTRUCTIONS
Same Day Surgery Discharge Instructions  Special Precautions After Surgery - Adult    It is not unusual to feel lightheaded or faint, up to 24 hours after surgery or while taking pain medication.  If you have these symptoms; sit for a few minutes before standing and have someone assist you when getting up.  You should rest and relax for the next 24 hours and must have someone stay with you for at least 24 hours after your discharge.  DO NOT DRIVE any vehicle or operate mechanical equipment for 24 hours following the end of your surgery.  DO NOT DRIVE while taking narcotic pain medications that have been prescribed by your physician.  If you had a limb operated on, you must be able to use it fully to drive.  DO NOT drink alcoholic beverages for 24 hours following surgery or while taking prescription pain medication.  Drink clear liquids (apple juice, ginger ale, broth, 7-Up, etc.).  Progress to your regular diet as you feel able.  Any questions call your physician and do not make important decisions for 24 hours.    Nausea and Vomiting: Nausea and vomiting can occur any time after receiving anesthesia. If you experience nausea and vomiting we encourage you to move to a clear liquid diet and advance your diet as tolerated. If nausea and vomiting do not improve within 12 hours please call the surgeon or present to the Emergency department.     Break-through Bleeding: If your experience bleeding from your surgical site apply pressure and additional dressing per nurse instruction. For simple problems such as a saturated dressing, you may need to reinforce the dressing with more gauze and tape and put slight pressure on the site. If bleeding does not subside contact the surgeon or present to the Emergency Department.    Post-op Infection: If you develop a fever of 100.4 or greater, have pus like drainage, redness, swelling or severe pain at the surgical site not alleviated with pain medications; please  contact the surgeon or present to the Emergency Department.     Medications:  Acetaminophen (Tylenol):  Next dose: OK anytime after 4:15pm.  Ibuprofen (Motrin, Advil):  Next dose: OK to start anytime if your primary care provider has approved that you can have NSAIDS.  __________________________________________________________________________________________________________________________________  IMPORTANT NUMBERS:    [facility] Main Number:  [phone], [phone]  Pharmacy:  [phone]  Same Day Surgery:  [phone], for general post-op questions call Monday - Thursday until 8:30 p.m., Fridays until 6:00 p.m.                                                                      [facility]:  [phone]

## 2024-07-17 NOTE — ANESTHESIA PROCEDURE NOTES
Brachial plexus (ISB) Procedure Note    Pre-Procedure   Staff -        CRNA: Suzi Zhu APRN CRNA       Other Anesthesia Staff: Bisi Gonzalez       Performed By: CRNA       Pre-Anesthestic Checklist: patient identified, IV checked, site marked, risks and benefits discussed, informed consent, monitors and equipment checked, pre-op evaluation, at physician/surgeon's request and post-op pain management  Timeout:       Correct Patient: Yes        Correct Procedure: Yes        Correct Site: Yes        Correct Position: Yes        Correct Laterality: Yes        Site Marked: Yes  Procedure Documentation  Procedure: Brachial plexus (ISB)       Laterality: right       Patient Position: lateral       Patient Prep/Sterile Barriers: sterile gloves, mask, patient draped       Skin prep: Chloraprep       Local skin infiltrated with 1 mL of 1% lidocaine.  (interscalene approach).       Needle Type: insulated       Needle Gauge: 22.        Needle Length (millimeters): 100                 Ultrasound guided       1. Ultrasound was used to identify targeted nerve, plexus, vascular marker, or fascial plane and place a needle adjacent to it in real-time.       2. Ultrasound was used to visualize the spread of anesthetic in close proximity to the above referenced structure.       3. A permanent image is entered into the patient's record.       4. The visualized anatomic structures appeared normal.       5. There were no apparent abnormal pathologic findings.    Assessment/Narrative         The placement was negative for: blood aspirated, painful injection and site bleeding       Paresthesias: No.       Bolus given via catheter. no blood aspirated via catheter.        Secured via.        Insertion/Infusion Method: Continuous Infusion and Single Shot       Complications: none       Injection made incrementally with aspirations every 5 mL.    Medication(s) Administered   Bupivacaine 0.5% PF (Infiltration) - Infiltration   10 mL -  "7/17/2024 10:55:00 AM  Bupivacaine liposome (Exparel) 1.3% LA inj susp (Infiltration) - Infiltration   10 mL - 7/17/2024 10:55:00 AM    FOR [facility] ONLY:   Pain Team Contact information: please page the Pain Team Via Push IO. Search \"Pain\". During daytime hours, please page the attending first. At night please page the resident first.      "

## 2024-07-17 NOTE — OP NOTE
Procedure Date: 7/17/24     PREOPERATIVE DIAGNOSIS:  Right shoulder rotator cuff tear with shoulder. Impingement, biceps tendinosis, possible labral tear, Acromioclavicular arthrosis     POSTOPERATIVE DIAGNOSIS:  same     PROCEDURE PERFORMED: Right shoulder mini open rotator cuff repair mini open biceps tenodesis arthroscopic subacromial decompression with acromioplasty and intra-articular debridement.      SURGEON:  Ronaldo Cerna MD     ASSISTANT:  Angel Olivas PA-C.  Angel Olivas PA-C, was present throughout the procedure, critical for patient positioning, prepping, draping, safe progression of procedure, wound closure, and sling placement.     DESCRIPTION OF PROCEDURE:  After informed risks, benefits, alternatives, and expected outcomes of the procedure, the patient desired to proceed.  She was brought to the operating suite where he was placed under general anesthesia, received 2 grams of Ancef,  A timeout verification step was completed.  He was positioned in low beach chair position.  Her right upper extremity was prepped and draped in a manner appropriate for procedure.    Standard arthroscopic portals were established.  Diagnostic arthroscopy was carried out.  There was grade 0-1 changes affecting the glenoid and portions of the humeral head there was partial rupture biceps pulley of the biceps tendon demonstrated and minimal degenerative tearing of the labrum.  Complete rupture of proximal subscapularis tendon with retraction to labrum and a complete tear of the supraspinatus and portions of the infraspinatus tendon was demonstrated.    Scope was placed in the subacromial space a extensive bursectomy subacromial decompression and acromioplasty was completed.  There was degenerative spurring but no impinging osteophytes at the AC joint it was therefore left alone.  A traction stitch was placed within the supraspinatus tendon demonstrating excellent mobility and ability to repair the tear.   Additional traction stiches were placed in the subscapularis tendon with mobility demonstrated.    Lateral portal was extended and at the deltoid splitting approach was taken exposing the subacromial space.  Hemostasis was achieved.  An extensive bursectomy was then completed revealing a large complex tear involving the majority of the subscapularis entire supraspinatus and portion of the infraspinatus tendon.  Multiple fiber link traction stitches were placed and dogear control stitches.  The attention was then directed back to the biceps groove.  Transverse ligament was divided biceps tendon was captured with a fiberwire  stitch.     Attention was directed towards completing a double row mini open supraspinatus and infraspinatus  rotator cuff repair bleeding cancellous bed was developed over the greater tuberosity using a Adson rondure a knotless speed bridge construct was utilized dual proximal row 4.75 swivel locks with fiber tape were passed individually plus knotless horizontal mattress stitches were passed with fiberlinks. A transosseous equivalent repair was completed with supplemental dogear control and traction stitches effecting an anatomic repair of the cuff. The knotless  horizontal mattress repair was completed by passing repair stitches through opposite anchors completing  a transosseous equivalent repair with good stable repair of the rotator cuff. Arthroscopic views demonstrated good position of the repaired cuff.  An additional anterior anchor was placed, 4.75 swivel lock for subscapularis fixation.  The biceps was tenodesed within the bicipital groove utilizing a 4.75 swivel lock and incorporating a portion of the transosseous equivalent repair.  Stable repair was felt to have been achieved.     A ll  Wounds were copiously irrigated. Deltoid was repaired with buried number one vicryl figure 8 stitches times two.  Subcutaneous was closed with 2-0 Vicryl.  Skin was closed with a 3-0 Stratafix,  Steri-Strips, and a sterile bandage. The ultra sling 4 was placed. The patient tolerated the procedure well.  Returned to recovery in stable condition.     ESTIMATED BLOOD LOSS:  less than 100 mL.     Ronaldo Cerna MD

## 2024-07-17 NOTE — BRIEF OP NOTE
[facility]    Brief Operative Note    Pre-operative diagnosis: Rotator cuff tear [M75.100]  Post-operative diagnosis Same as pre-operative diagnosis    Procedure: Shoulder Arthroscopy,Subacromial Decomprssion,Mini Open Biceps Tenodesis, rotator cuff repair, Right - Shoulder    Surgeon: Surgeons and Role:     * Ronaldo Cerna MD - Primary     * Angel Olivas PA-C - Assisting  Anesthesia: General with Block   Estimated Blood Loss: 100 mL from 7/17/2024 11:41 AM to 7/17/2024  2:06 PM      Drains: None  Specimens: * No specimens in log *  Findings:   None.  Complications: None.  Implants:   Implant Name Type Inv. Item Serial No.  Lot No. LRB No. Used Action   IMP ANCHOR [employer] BI[employer]OMP SWVLK 4.7MM W/BTT [id number] - [id number] Metallic Hardware/Brule IMP ANCHOR [employer] BIOCOMP SWVLK 4.7MM W/BTT [id number]  [employer] [id number] Right 1 Implanted   IMP ANCHOR [employer] BIOCOMP SWVLK 4.75MM W.BLTT [id number] - [id number] Metallic Hardware/Brule IMP ANCHOR [employer] BIOCOMP SWVLK 4.75MM W.BLTT [id number]  [employer] [id number] Right 1 Implanted   IMP ANCHOR [employer] BIO-SWIVELOCK 4.75X19.1MM AR-[id number] - [id number] Metallic Hardware/Brule IMP ANCHOR [employer] BIO-SWIVELOCK 4.75X19.1MM AR-[id number]  [employer] [id number] Right 1 Implanted   IMP ANCHOR [employer] BIO-SWIVELOCK 4.75X19.1MM AR-[id number] - [id number] Metallic Hardware/Brule IMP ANCHOR [employer] BIO-SWIVELOCK 4.75X19.1MM AR-2324BCC  [employer] [id number] Right 1 Implanted   IMP ANCHOR [employer] BIO-SWIVELOCK 4.75X19.1MM AR-2324BCC - [id number] Metallic Hardware/Brule IMP ANCHOR [employer] BIO-SWIVELOCK 4.75X19.1MM AR-[id number]  [employer]X [id number] Right 1 Implanted

## 2024-07-17 NOTE — ANESTHESIA CARE TRANSFER NOTE
Patient: Daniel Le    Procedure: Procedure(s):  Shoulder Arthroscopy,Subacromial Decomprssion,Mini Open Biceps Tenodesis, rotator cuff repair       Diagnosis: Rotator cuff tear [M75.100]  Diagnosis Additional Information: No value filed.    Anesthesia Type:   General     Note:    Oropharynx: oropharynx clear of all foreign objects  Level of Consciousness: drowsy  Oxygen Supplementation: face mask    Independent Airway: airway patency satisfactory and stable  Dentition: dentition unchanged  Vital Signs Stable: post-procedure vital signs reviewed and stable  Report to RN Given: handoff report given  Patient transferred to: PACU    Handoff Report: Identifed the Patient, Identified the Reponsible Provider, Reviewed the pertinent medical history, Discussed the surgical course, Reviewed Intra-OP anesthesia mangement and issues during anesthesia, Set expectations for post-procedure period and Allowed opportunity for questions and acknowledgement of understanding      Vitals:  Vitals Value Taken Time   BP     Temp     Pulse 64 07/17/24 1409   Resp 26 07/17/24 1409   SpO2 99 % 07/17/24 1409   Vitals shown include unfiled device data.    Electronically Signed By: JUANA Mota CRNA  July 17, 2024  2:10 PM

## 2024-07-30 ENCOUNTER — TRANSFERRED RECORDS (OUTPATIENT)
Dept: HEALTH INFORMATION MANAGEMENT | Facility: CLINIC | Age: 64
End: 2024-07-30
Payer: COMMERCIAL

## 2024-08-20 ENCOUNTER — TRANSFERRED RECORDS (OUTPATIENT)
Dept: HEALTH INFORMATION MANAGEMENT | Facility: CLINIC | Age: 64
End: 2024-08-20
Payer: COMMERCIAL

## 2024-09-24 ENCOUNTER — TRANSFERRED RECORDS (OUTPATIENT)
Dept: HEALTH INFORMATION MANAGEMENT | Facility: CLINIC | Age: 64
End: 2024-09-24
Payer: COMMERCIAL

## 2024-09-29 ENCOUNTER — HEALTH MAINTENANCE LETTER (OUTPATIENT)
Age: 64
End: 2024-09-29

## 2024-10-03 DIAGNOSIS — Z12.11 COLON CANCER SCREENING: ICD-10-CM

## 2024-10-17 ENCOUNTER — ORDERS ONLY (AUTO-RELEASED) (OUTPATIENT)
Dept: ADMISSION | Facility: CLINIC | Age: 64
End: 2024-10-17
Payer: COMMERCIAL

## 2024-10-17 DIAGNOSIS — Z12.11 COLON CANCER SCREENING: ICD-10-CM

## 2024-10-31 SDOH — HEALTH STABILITY: PHYSICAL HEALTH: ON AVERAGE, HOW MANY DAYS PER WEEK DO YOU ENGAGE IN MODERATE TO STRENUOUS EXERCISE (LIKE A BRISK WALK)?: 5 DAYS

## 2024-10-31 SDOH — HEALTH STABILITY: PHYSICAL HEALTH: ON AVERAGE, HOW MANY MINUTES DO YOU ENGAGE IN EXERCISE AT THIS LEVEL?: 30 MIN

## 2024-10-31 ASSESSMENT — SOCIAL DETERMINANTS OF HEALTH (SDOH): HOW OFTEN DO YOU GET TOGETHER WITH FRIENDS OR RELATIVES?: TWICE A WEEK

## 2024-11-01 ENCOUNTER — OFFICE VISIT (OUTPATIENT)
Dept: FAMILY MEDICINE | Facility: CLINIC | Age: 64
End: 2024-11-01
Payer: COMMERCIAL

## 2024-11-01 DIAGNOSIS — I10 ESSENTIAL HYPERTENSION: ICD-10-CM

## 2024-11-01 DIAGNOSIS — N18.32 STAGE 3B CHRONIC KIDNEY DISEASE (H): ICD-10-CM

## 2024-11-01 DIAGNOSIS — Z00.00 ROUTINE GENERAL MEDICAL EXAMINATION AT A HEALTH CARE FACILITY: Primary | ICD-10-CM

## 2024-11-01 DIAGNOSIS — E78.00 PURE HYPERCHOLESTEROLEMIA: ICD-10-CM

## 2024-11-01 DIAGNOSIS — I12.9 NEPHROSCLEROSIS, STAGE 1 THROUGH STAGE 4 OR UNSPECIFIED CHRONIC KIDNEY DISEASE: ICD-10-CM

## 2024-11-01 DIAGNOSIS — Z98.890 S/P RIGHT ROTATOR CUFF REPAIR: ICD-10-CM

## 2024-11-01 PROCEDURE — 80061 LIPID PANEL: CPT | Performed by: FAMILY MEDICINE

## 2024-11-01 PROCEDURE — 36415 COLL VENOUS BLD VENIPUNCTURE: CPT | Performed by: FAMILY MEDICINE

## 2024-11-01 PROCEDURE — 99396 PREV VISIT EST AGE 40-64: CPT | Mod: 25 | Performed by: FAMILY MEDICINE

## 2024-11-01 PROCEDURE — 80069 RENAL FUNCTION PANEL: CPT | Performed by: FAMILY MEDICINE

## 2024-11-01 PROCEDURE — 91320 SARSCV2 VAC 30MCG TRS-SUC IM: CPT | Performed by: FAMILY MEDICINE

## 2024-11-01 PROCEDURE — 90656 IIV3 VACC NO PRSV 0.5 ML IM: CPT | Performed by: FAMILY MEDICINE

## 2024-11-01 PROCEDURE — 99213 OFFICE O/P EST LOW 20 MIN: CPT | Mod: 25 | Performed by: FAMILY MEDICINE

## 2024-11-01 PROCEDURE — 90480 ADMN SARSCOV2 VAC 1/ONLY CMP: CPT | Performed by: FAMILY MEDICINE

## 2024-11-01 PROCEDURE — G0103 PSA SCREENING: HCPCS | Performed by: FAMILY MEDICINE

## 2024-11-01 PROCEDURE — 90471 IMMUNIZATION ADMIN: CPT | Performed by: FAMILY MEDICINE

## 2024-11-01 NOTE — PROGRESS NOTES
"Preventive Care Visit  [facility]  Derrell Souza MD, Family Medicine  Nov 1, 2024      Assessment & Plan     Routine general medical examination at a health care facility    Recommend increasing physical activity  Check a PSA    - PSA, screen; Future    Nephrosclerosis, stage 1 through stage 4 or unspecified chronic kidney disease  Stage 3b chronic kidney disease (H)    Check laboratory testing  Recommend avoiding NSAIDs and nephrotoxic substances  Follow-up with nephrology  He will continue losartan  Consider an SGLT2 inhibitor    - Adult Nephrology  Referral; Future  - Renal panel; Future    Essential Hypertension    Reviewed blood pressure readings which have been stable at home  He will continue losartan    Essential Hypercholesterolemia    Check laboratory testing as noted  Continue simvastatin    - Lipid panel reflex to direct LDL Fasting; Future    S/P right rotator cuff repair  Recommend ongoing following up with orthopedics as he recovers            BMI  Estimated body mass index is 32.02 kg/m  as calculated from the following:    Height as of this encounter: 1.753 m (5' 9\").    Weight as of this encounter: 98.3 kg (216 lb 12.8 oz).       Counseling  Appropriate preventive services were addressed with this patient via screening, questionnaire, or discussion as appropriate for fall prevention, nutrition, physical activity, Tobacco-use cessation, social engagement, weight loss and cognition.  Checklist reviewing preventive services available has been given to the patient.  Reviewed patient's diet, addressing concerns and/or questions.           Bear Parks is a 64 year old, presenting for the following:  Physical (Fasting for blood work )        11/1/2024     7:40 AM   Additional Questions   Roomed by Mahsa CAI    Charly presents for a preventive health visit as well as a medication check.    Medical history is notable for chronic kidney " disease stage IIIb and hyperlipidemia.    He continues to be treated with losartan as well as simvastatin.    He has followed up with nephrology in the past but has not followed up lately. He was assessed to have arteriosclerosis due to hypertension, dyslipidemia, and aging.  He typically has been compliant with his medications and is due for follow-up.      Recent history is notable for a right rotator cuff repair.  He continues to recover after having arthroscopic surgery with mini open rotator cuff surgery in July of this year.               Health Care Directive  Patient does not have a Health Care Directive: Discussed advance care planning with patient; information given to patient to review.      10/31/2024   General Health   How would you rate your overall physical health? Excellent   Feel stress (tense, anxious, or unable to sleep) Not at all            10/31/2024   Nutrition   Three or more servings of calcium each day? Yes   Diet: Regular (no restrictions)   How many servings of fruit and vegetables per day? 4 or more   How many sweetened beverages each day? 0-1            10/31/2024   Exercise   Days per week of moderate/strenous exercise 5 days   Average minutes spent exercising at this level 30 min            10/31/2024   Social Factors   Frequency of gathering with friends or relatives Twice a week   Worry food won't last until get money to buy more No   Food not last or not have enough money for food? No   Do you have housing? (Housing is defined as stable permanent housing and does not include staying ouside in a car, in a tent, in an abandoned building, in an overnight shelter, or couch-surfing.) Yes   Are you worried about losing your housing? No   Lack of transportation? No   Unable to get utilities (heat,electricity)? No            10/31/2024   Fall Risk   Fallen 2 or more times in the past year? No    Trouble with walking or balance? No        Patient-reported          10/31/2024   Dental  "  Dentist two times every year? Yes            10/31/2024   TB Screening   Were you born outside of the [place]? No              Today's PHQ-2 Score:       7/1/2024     9:58 AM   PHQ-2 ( 1999 [employer])   Q1: Little interest or pleasure in doing things 0    Q2: Feeling down, depressed or hopeless 0    PHQ-2 Score 0   Q1: Little interest or pleasure in doing things Not at all   Q2: Feeling down, depressed or hopeless Not at all   PHQ-2 Score 0       Patient-reported         10/31/2024   Substance Use   Alcohol more than 3/day or more than 7/wk No   Do you use any other substances recreationally? No        Social History     Tobacco Use    Smoking status: Never    Smokeless tobacco: Never   Substance Use Topics    Alcohol use: Yes     Comment: Alcoholic Drinks/day: Occasionally    Drug use: No           10/31/2024   STI Screening   New sexual partner(s) since last STI/HIV test? No      Last PSA:   Prostate Specific Antigen Screen   Date Value Ref Range Status   12/14/2022 0.97 0.00 - 4.50 ng/mL Final     ASCVD Risk   The 10-year ASCVD risk score (Peter OLIVEROS, et al., 2019) is: 17%    Values used to calculate the score:      Age: 64 years      Sex: Male      Is Non- : No      Diabetic: No      Tobacco smoker: No      Systolic Blood Pressure: 144 mmHg      Is BP treated: Yes      HDL Cholesterol: 39 mg/dL      Total Cholesterol: 186 mg/dL           Reviewed and updated as needed this visit by Provider                             Objective    Exam  BP (!) 144/81 (BP Location: Left arm, Patient Position: Sitting, Cuff Size: Adult Large)   Pulse 66   Temp 97.8  F (36.6  C) (Oral)   Resp 16   Ht 1.753 m (5' 9\")   Wt 98.3 kg (216 lb 12.8 oz)   SpO2 100%   BMI 32.02 kg/m     Estimated body mass index is 32.02 kg/m  as calculated from the following:    Height as of this encounter: 1.753 m (5' 9\").    Weight as of this encounter: 98.3 kg (216 lb 12.8 oz).    Physical Exam  GENERAL: alert and no " distress  EYES: Eyes grossly normal to inspection, PERRL and conjunctivae and sclerae normal  HENT: ear canals and TM's normal, nose and mouth without ulcers or lesions  NECK: no adenopathy, no asymmetry, masses, or scars  RESP: lungs clear to auscultation - no rales, rhonchi or wheezes  CV: regular rate and rhythm, normal S1 S2, no S3 or S4, no murmur, click or rub, no peripheral edema  ABDOMEN: soft, nontender  MS: no gross musculoskeletal defects noted, no edema  SKIN: no suspicious lesions or rashes  NEURO: Normal strength and tone, mentation intact and speech normal  PSYCH: mentation appears normal, affect normal/bright    Prior to immunization administration, verified patients identity using patient s name and date of birth. Please see Immunization Activity for additional information.     Screening Questionnaire for Adult Immunization    Are you sick today?   No   Do you have allergies to medications, food, a vaccine component or latex?   No   Have you ever had a serious reaction after receiving a vaccination?   No   Do you have a long-term health problem with heart, lung, kidney, or metabolic disease (e.g., diabetes), asthma, a blood disorder, no spleen, complement component deficiency, a cochlear implant, or a spinal fluid leak?  Are you on long-term aspirin therapy?   No   Do you have cancer, leukemia, HIV/AIDS, or any other immune system problem?   No   Do you have a parent, brother, or sister with an immune system problem?   No   In the past 3 months, have you taken medications that affect  your immune system, such as prednisone, other steroids, or anticancer drugs; drugs for the treatment of rheumatoid arthritis, Crohn s disease, or psoriasis; or have you had radiation treatments?   No   Have you had a seizure, or a brain or other nervous system problem?   No   During the past year, have you received a transfusion of blood or blood    products, or been given immune (gamma) globulin or antiviral drug?    No   For women: Are you pregnant or is there a chance you could become       pregnant during the next month?   No   Have you received any vaccinations in the past 4 weeks?   No     Immunization questionnaire answers were all negative.      Patient instructed to remain in clinic for 15 minutes afterwards, and to report any adverse reactions.     Screening performed by Mahsa Sutton MA on 11/1/2024 at 8:46 AM.         Signed Electronically by: Derrell Souza MD

## 2024-11-01 NOTE — PATIENT INSTRUCTIONS
Patient Education     Charly,    Continue to work with orthopedics for your shoulder recovery  You received a COVID and influenza vaccine today  You can consider the other vaccines in the future including shingles, pneumonia, and RSV  Follow-up with the kidney specialist as the next step.    You can consider the class of medications known as SGLT2 inhibitors such as Jardiance.  Those are protective for the kidneys  I recommend reviewing with nephrology  Increase aerobic exercise as you are able  Limit sodium intake  Monitor your blood pressure    Derrell Souza MD    Preventive Care Advice   This is general advice given by our system to help you stay healthy. However, your care team may have specific advice just for you. Please talk to your care team about your preventive care needs.  Nutrition  Eat 5 or more servings of fruits and vegetables each day.  Try wheat bread, brown rice and whole grain pasta (instead of white bread, rice, and pasta).  Get enough calcium and vitamin D. Check the label on foods and aim for 100% of the RDA (recommended daily allowance).  Lifestyle  Exercise at least 150 minutes each week  (30 minutes a day, 5 days a week).  Do muscle strengthening activities 2 days a week. These help control your weight and prevent disease.  No smoking.  Wear sunscreen to prevent skin cancer.  Have a dental exam and cleaning every 6 months.  Yearly exams  See your health care team every year to talk about:  Any changes in your health.  Any medicines your care team has prescribed.  Preventive care, family planning, and ways to prevent chronic diseases.  Shots (vaccines)   HPV shots (up to age 26), if you've never had them before.  Hepatitis B shots (up to age 59), if you've never had them before.  COVID-19 shot: Get this shot when it's due.  Flu shot: Get a flu shot every year.  Tetanus shot: Get a tetanus shot every 10 years.  Pneumococcal, hepatitis A, and RSV shots: Ask your care team if you need these  based on your risk.  Shingles shot (for age 50 and up)  General health tests  Diabetes screening:  Starting at age 35, Get screened for diabetes at least every 3 years.  If you are younger than age 35, ask your care team if you should be screened for diabetes.  Cholesterol test: At age 39, start having a cholesterol test every 5 years, or more often if advised.  Bone density scan (DEXA): At age 50, ask your care team if you should have this scan for osteoporosis (brittle bones).  Hepatitis C: Get tested at least once in your life.

## 2024-11-02 LAB
ALBUMIN SERPL BCG-MCNC: 4.4 G/DL (ref 3.5–5.2)
ANION GAP SERPL CALCULATED.3IONS-SCNC: 11 MMOL/L (ref 7–15)
BUN SERPL-MCNC: 23.9 MG/DL (ref 8–23)
CALCIUM SERPL-MCNC: 9.4 MG/DL (ref 8.8–10.4)
CHLORIDE SERPL-SCNC: 104 MMOL/L (ref 98–107)
CHOLEST SERPL-MCNC: 166 MG/DL
CREAT SERPL-MCNC: 1.9 MG/DL (ref 0.67–1.17)
EGFRCR SERPLBLD CKD-EPI 2021: 39 ML/MIN/1.73M2
FASTING STATUS PATIENT QL REPORTED: YES
GLUCOSE SERPL-MCNC: 100 MG/DL (ref 70–99)
HCO3 SERPL-SCNC: 24 MMOL/L (ref 22–29)
HDLC SERPL-MCNC: 41 MG/DL
LDLC SERPL CALC-MCNC: 106 MG/DL
NONHDLC SERPL-MCNC: 125 MG/DL
PHOSPHATE SERPL-MCNC: 3.6 MG/DL (ref 2.5–4.5)
POTASSIUM SERPL-SCNC: 4.2 MMOL/L (ref 3.4–5.3)
PSA SERPL DL<=0.01 NG/ML-MCNC: 0.97 NG/ML (ref 0–4.5)
SODIUM SERPL-SCNC: 139 MMOL/L (ref 135–145)
TRIGL SERPL-MCNC: 95 MG/DL

## 2024-11-10 VITALS
HEIGHT: 69 IN | BODY MASS INDEX: 32.11 KG/M2 | HEART RATE: 66 BPM | RESPIRATION RATE: 16 BRPM | DIASTOLIC BLOOD PRESSURE: 75 MMHG | SYSTOLIC BLOOD PRESSURE: 130 MMHG | WEIGHT: 216.8 LBS | OXYGEN SATURATION: 100 % | TEMPERATURE: 97.8 F

## 2024-12-03 ENCOUNTER — TRANSFERRED RECORDS (OUTPATIENT)
Dept: HEALTH INFORMATION MANAGEMENT | Facility: CLINIC | Age: 64
End: 2024-12-03
Payer: COMMERCIAL

## 2024-12-28 LAB — NONINV COLON CA DNA+OCC BLD SCRN STL QL: NEGATIVE

## 2024-12-30 DIAGNOSIS — I10 ESSENTIAL HYPERTENSION: ICD-10-CM

## 2024-12-31 RX ORDER — LOSARTAN POTASSIUM 100 MG/1
100 TABLET ORAL DAILY
Qty: 90 TABLET | Refills: 1 | Status: SHIPPED | OUTPATIENT
Start: 2024-12-31

## 2025-07-12 DIAGNOSIS — I10 ESSENTIAL HYPERTENSION: ICD-10-CM

## 2025-07-14 RX ORDER — LOSARTAN POTASSIUM 100 MG/1
100 TABLET ORAL DAILY
Qty: 90 TABLET | Refills: 0 | Status: SHIPPED | OUTPATIENT
Start: 2025-07-14

## 2025-08-12 ENCOUNTER — TRANSFERRED RECORDS (OUTPATIENT)
Dept: HEALTH INFORMATION MANAGEMENT | Facility: CLINIC | Age: 65
End: 2025-08-12
Payer: COMMERCIAL

## (undated) DEVICE — TUBING ARTHROSCOPY PUMP ARTHREX AR-6410

## (undated) DEVICE — DRSG STERI STRIP 1X5" R1548

## (undated) DEVICE — SU ETHILON 3-0 PS-2 18" 1669H

## (undated) DEVICE — SU FIBERWIRE 2 38"  AR-7200

## (undated) DEVICE — SOL NACL 0.9% IRRIG 3000ML BAG 07972-08

## (undated) DEVICE — SUTURE PASSER SU CAPTURE SYS S&N FIRSTPASS 22-4038

## (undated) DEVICE — GLOVE BIOGEL PI MICRO INDICATOR UNDERGLOVE SZ 8.5 48985

## (undated) DEVICE — DRAPE ARTHROSCOPY SHOULDER BEACHCHAIR 29369

## (undated) DEVICE — IMM KIT SHOULDER TMAX MASK FACE 7210559

## (undated) DEVICE — BUR ARTHREX COOLCUT EXCALIBUR 4.0MMX13CM AR-8400EX

## (undated) DEVICE — SUTURE ABSORBABLE VICRYL CT-B1 L36 IN BRAID VIOLET JB947H

## (undated) DEVICE — SOL NACL 0.9% IRRIG 1000ML BOTTLE 07138-09

## (undated) DEVICE — GLOVE BIOGEL PI MICRO SZ 7.0 48570

## (undated) DEVICE — PAD FLOOR SURGISAFE

## (undated) DEVICE — PREP CHLORAPREP 26ML TINTED ORANGE  260815

## (undated) DEVICE — SU ETHIBOND 1 CT-1 30" X425H

## (undated) DEVICE — SU FIBERLINK #2 BRAIDED PB BLUE W/1.5" CLSD LOOP  AR-7235

## (undated) DEVICE — NDL SPINAL 18GA 3.5" 405184

## (undated) DEVICE — SU VICRYL 2-0 CT-1 36" UND J945H

## (undated) DEVICE — SU ETHIBOND 1 OS-6 36" X538

## (undated) DEVICE — SU MONOCRYL 3-0 PS-2 18" UND Y497G

## (undated) DEVICE — PACK SHOULDER

## (undated) DEVICE — ABLATOR ARTHREX APOLLO RF MP90 ASPIRATING 90DEG AR-9811

## (undated) DEVICE — SUCTION MANIFOLD NEPTUNE 2 SYS 4 PORT 0702-020-000

## (undated) DEVICE — STOCKING SLEEVE COMPRESSION CALF LG

## (undated) DEVICE — GLOVE BIOGEL PI MICRO INDICATOR UNDERGLOVE SZ 7.5 48975

## (undated) DEVICE — DRSG ADAPTIC 3X8" X3

## (undated) DEVICE — GLOVE BIOGEL PI MICRO SZ 8.0 48580

## (undated) DEVICE — TAPE MEDIPORE 4"X10YD 2964

## (undated) DEVICE — SOL WATER IRRIG 1000ML BOTTLE 07139-09

## (undated) DEVICE — GOWN IMPERVIOUS SPECIALTY XLG/XLONG 32474

## (undated) DEVICE — BLANKET BAIR HUGGER LOWER BODY 42568

## (undated) DEVICE — SU VICRYL 2-0 CT-2 27" UND J269H

## (undated) DEVICE — SLING ARM MED 0814-0063

## (undated) DEVICE — ARTHROSCOPIC CANNULA TWIST-IN PURPLE 7MMX7CM AR-6570

## (undated) DEVICE — BUR ARTHREX COOLCUT OVAL 8 FLUTE 5.0MMX13CM AR-8500OBE

## (undated) DEVICE — SUCTION TIP FLEXI CLEAR TIP DISP K62

## (undated) RX ORDER — FENTANYL CITRATE-0.9 % NACL/PF 10 MCG/ML
PLASTIC BAG, INJECTION (ML) INTRAVENOUS
Status: DISPENSED
Start: 2024-07-17

## (undated) RX ORDER — FENTANYL CITRATE 50 UG/ML
INJECTION, SOLUTION INTRAMUSCULAR; INTRAVENOUS
Status: DISPENSED
Start: 2024-07-17

## (undated) RX ORDER — PHENYLEPHRINE HYDROCHLORIDE 10 MG/ML
INJECTION INTRAVENOUS
Status: DISPENSED
Start: 2024-07-17

## (undated) RX ORDER — DEXAMETHASONE SODIUM PHOSPHATE 4 MG/ML
INJECTION, SOLUTION INTRA-ARTICULAR; INTRALESIONAL; INTRAMUSCULAR; INTRAVENOUS; SOFT TISSUE
Status: DISPENSED
Start: 2024-07-17

## (undated) RX ORDER — GLYCOPYRROLATE 0.2 MG/ML
INJECTION, SOLUTION INTRAMUSCULAR; INTRAVENOUS
Status: DISPENSED
Start: 2024-07-17

## (undated) RX ORDER — ACETAMINOPHEN 325 MG/1
TABLET ORAL
Status: DISPENSED
Start: 2024-07-17

## (undated) RX ORDER — LIDOCAINE HYDROCHLORIDE 10 MG/ML
INJECTION, SOLUTION EPIDURAL; INFILTRATION; INTRACAUDAL; PERINEURAL
Status: DISPENSED
Start: 2024-07-17

## (undated) RX ORDER — CEFAZOLIN SODIUM/WATER 2 G/20 ML
SYRINGE (ML) INTRAVENOUS
Status: DISPENSED
Start: 2024-07-17

## (undated) RX ORDER — ONDANSETRON 2 MG/ML
INJECTION INTRAMUSCULAR; INTRAVENOUS
Status: DISPENSED
Start: 2024-07-17

## (undated) RX ORDER — PROPOFOL 10 MG/ML
INJECTION, EMULSION INTRAVENOUS
Status: DISPENSED
Start: 2024-07-17

## (undated) RX ORDER — HYDROXYZINE HYDROCHLORIDE 25 MG/1
TABLET, FILM COATED ORAL
Status: DISPENSED
Start: 2024-07-17